# Patient Record
Sex: FEMALE | Race: BLACK OR AFRICAN AMERICAN | NOT HISPANIC OR LATINO | Employment: OTHER | URBAN - METROPOLITAN AREA
[De-identification: names, ages, dates, MRNs, and addresses within clinical notes are randomized per-mention and may not be internally consistent; named-entity substitution may affect disease eponyms.]

---

## 2021-11-03 ENCOUNTER — HISTORICAL (OUTPATIENT)
Dept: ADMINISTRATIVE | Facility: HOSPITAL | Age: 66
End: 2021-11-03

## 2021-11-05 LAB — FINAL CULTURE: NORMAL

## 2024-03-23 ENCOUNTER — HOSPITAL ENCOUNTER (INPATIENT)
Facility: HOSPITAL | Age: 69
LOS: 4 days | Discharge: HOME | DRG: 392 | End: 2024-03-27
Attending: INTERNAL MEDICINE | Admitting: INTERNAL MEDICINE
Payer: MEDICARE

## 2024-03-23 DIAGNOSIS — K56.699 STRICTURE OF SIGMOID COLON: ICD-10-CM

## 2024-03-23 DIAGNOSIS — R10.30 LOWER ABDOMINAL PAIN: Primary | ICD-10-CM

## 2024-03-23 DIAGNOSIS — I31.39 PERICARDIAL EFFUSION: ICD-10-CM

## 2024-03-23 DIAGNOSIS — Z01.818 PRE-OP EXAM: ICD-10-CM

## 2024-03-23 LAB
ALBUMIN SERPL-MCNC: 4 G/DL (ref 3.4–4.8)
ALBUMIN/GLOB SERPL: 1.3 RATIO (ref 1.1–2)
ALP SERPL-CCNC: 56 UNIT/L (ref 40–150)
ALT SERPL-CCNC: 21 UNIT/L (ref 0–55)
APPEARANCE UR: CLEAR
APTT PPP: 28.5 SECONDS (ref 23.2–33.7)
AST SERPL-CCNC: 20 UNIT/L (ref 5–34)
BACTERIA #/AREA URNS AUTO: ABNORMAL /HPF
BASOPHILS # BLD AUTO: 0.03 X10(3)/MCL
BASOPHILS NFR BLD AUTO: 0.4 %
BILIRUB SERPL-MCNC: 0.6 MG/DL
BILIRUB UR QL STRIP.AUTO: NEGATIVE
BUN SERPL-MCNC: 20.4 MG/DL (ref 9.8–20.1)
CALCIUM SERPL-MCNC: 9.2 MG/DL (ref 8.4–10.2)
CHLORIDE SERPL-SCNC: 109 MMOL/L (ref 98–107)
CO2 SERPL-SCNC: 24 MMOL/L (ref 23–31)
COLOR UR AUTO: ABNORMAL
CREAT SERPL-MCNC: 0.92 MG/DL (ref 0.55–1.02)
EOSINOPHIL # BLD AUTO: 0.02 X10(3)/MCL (ref 0–0.9)
EOSINOPHIL NFR BLD AUTO: 0.3 %
ERYTHROCYTE [DISTWIDTH] IN BLOOD BY AUTOMATED COUNT: 13.4 % (ref 11.5–17)
GFR SERPLBLD CREATININE-BSD FMLA CKD-EPI: >60 MLS/MIN/1.73/M2
GLOBULIN SER-MCNC: 3.1 GM/DL (ref 2.4–3.5)
GLUCOSE SERPL-MCNC: 79 MG/DL (ref 82–115)
GLUCOSE UR QL STRIP.AUTO: NORMAL
HCT VFR BLD AUTO: 36.7 % (ref 37–47)
HGB BLD-MCNC: 11.9 G/DL (ref 12–16)
IMM GRANULOCYTES # BLD AUTO: 0.03 X10(3)/MCL (ref 0–0.04)
IMM GRANULOCYTES NFR BLD AUTO: 0.4 %
INR PPP: 1.1
KETONES UR QL STRIP.AUTO: NEGATIVE
LEUKOCYTE ESTERASE UR QL STRIP.AUTO: NEGATIVE
LYMPHOCYTES # BLD AUTO: 1.51 X10(3)/MCL (ref 0.6–4.6)
LYMPHOCYTES NFR BLD AUTO: 19.1 %
MCH RBC QN AUTO: 29.5 PG (ref 27–31)
MCHC RBC AUTO-ENTMCNC: 32.4 G/DL (ref 33–36)
MCV RBC AUTO: 90.8 FL (ref 80–94)
MONOCYTES # BLD AUTO: 0.65 X10(3)/MCL (ref 0.1–1.3)
MONOCYTES NFR BLD AUTO: 8.2 %
MUCOUS THREADS URNS QL MICRO: ABNORMAL /LPF
NEUTROPHILS # BLD AUTO: 5.66 X10(3)/MCL (ref 2.1–9.2)
NEUTROPHILS NFR BLD AUTO: 71.6 %
NITRITE UR QL STRIP.AUTO: NEGATIVE
NRBC BLD AUTO-RTO: 0 %
PH UR STRIP.AUTO: 6.5 [PH]
PLATELET # BLD AUTO: 171 X10(3)/MCL (ref 130–400)
PMV BLD AUTO: 11.7 FL (ref 7.4–10.4)
POTASSIUM SERPL-SCNC: 3.9 MMOL/L (ref 3.5–5.1)
PROT SERPL-MCNC: 7.1 GM/DL (ref 5.8–7.6)
PROT UR QL STRIP.AUTO: ABNORMAL
PROTHROMBIN TIME: 14.2 SECONDS (ref 12.5–14.5)
RBC # BLD AUTO: 4.04 X10(6)/MCL (ref 4.2–5.4)
RBC #/AREA URNS AUTO: ABNORMAL /HPF
RBC UR QL AUTO: NEGATIVE
SODIUM SERPL-SCNC: 142 MMOL/L (ref 136–145)
SP GR UR STRIP.AUTO: 1.02 (ref 1–1.03)
SQUAMOUS #/AREA URNS LPF: ABNORMAL /HPF
UROBILINOGEN UR STRIP-ACNC: NORMAL
WBC # SPEC AUTO: 7.9 X10(3)/MCL (ref 4.5–11.5)
WBC #/AREA URNS AUTO: ABNORMAL /HPF

## 2024-03-23 PROCEDURE — 96374 THER/PROPH/DIAG INJ IV PUSH: CPT

## 2024-03-23 PROCEDURE — 80053 COMPREHEN METABOLIC PANEL: CPT | Performed by: NURSE PRACTITIONER

## 2024-03-23 PROCEDURE — 85025 COMPLETE CBC W/AUTO DIFF WBC: CPT | Performed by: NURSE PRACTITIONER

## 2024-03-23 PROCEDURE — 96375 TX/PRO/DX INJ NEW DRUG ADDON: CPT

## 2024-03-23 PROCEDURE — 63600175 PHARM REV CODE 636 W HCPCS: Performed by: NURSE PRACTITIONER

## 2024-03-23 PROCEDURE — 93010 ELECTROCARDIOGRAM REPORT: CPT | Mod: ,,, | Performed by: INTERNAL MEDICINE

## 2024-03-23 PROCEDURE — 11000001 HC ACUTE MED/SURG PRIVATE ROOM

## 2024-03-23 PROCEDURE — 85610 PROTHROMBIN TIME: CPT | Performed by: NURSE PRACTITIONER

## 2024-03-23 PROCEDURE — 81001 URINALYSIS AUTO W/SCOPE: CPT | Performed by: NURSE PRACTITIONER

## 2024-03-23 PROCEDURE — 93005 ELECTROCARDIOGRAM TRACING: CPT

## 2024-03-23 PROCEDURE — 99285 EMERGENCY DEPT VISIT HI MDM: CPT | Mod: 25

## 2024-03-23 PROCEDURE — 25500020 PHARM REV CODE 255: Performed by: NURSE PRACTITIONER

## 2024-03-23 PROCEDURE — 85730 THROMBOPLASTIN TIME PARTIAL: CPT | Performed by: NURSE PRACTITIONER

## 2024-03-23 RX ORDER — HYDRALAZINE HYDROCHLORIDE 20 MG/ML
10 INJECTION INTRAMUSCULAR; INTRAVENOUS
Status: COMPLETED | OUTPATIENT
Start: 2024-03-23 | End: 2024-03-23

## 2024-03-23 RX ORDER — ONDANSETRON HYDROCHLORIDE 2 MG/ML
4 INJECTION, SOLUTION INTRAVENOUS
Status: COMPLETED | OUTPATIENT
Start: 2024-03-23 | End: 2024-03-23

## 2024-03-23 RX ORDER — LOSARTAN POTASSIUM 100 MG/1
100 TABLET ORAL DAILY
COMMUNITY

## 2024-03-23 RX ORDER — MORPHINE SULFATE 4 MG/ML
4 INJECTION, SOLUTION INTRAMUSCULAR; INTRAVENOUS
Status: COMPLETED | OUTPATIENT
Start: 2024-03-23 | End: 2024-03-23

## 2024-03-23 RX ORDER — SIMVASTATIN 20 MG/1
20 TABLET, FILM COATED ORAL NIGHTLY
COMMUNITY

## 2024-03-23 RX ORDER — AMLODIPINE BESYLATE 5 MG/1
5 TABLET ORAL 2 TIMES DAILY
COMMUNITY

## 2024-03-23 RX ORDER — ESTRADIOL 2 MG/1
2 TABLET ORAL DAILY
COMMUNITY

## 2024-03-23 RX ORDER — ONDANSETRON HYDROCHLORIDE 2 MG/ML
4 INJECTION, SOLUTION INTRAVENOUS EVERY 6 HOURS PRN
Status: DISCONTINUED | OUTPATIENT
Start: 2024-03-23 | End: 2024-03-27 | Stop reason: HOSPADM

## 2024-03-23 RX ORDER — HYDRALAZINE HYDROCHLORIDE 20 MG/ML
10 INJECTION INTRAMUSCULAR; INTRAVENOUS EVERY 4 HOURS PRN
Status: DISCONTINUED | OUTPATIENT
Start: 2024-03-23 | End: 2024-03-27 | Stop reason: HOSPADM

## 2024-03-23 RX ORDER — DOXAZOSIN 4 MG/1
4 TABLET ORAL NIGHTLY
COMMUNITY

## 2024-03-23 RX ORDER — ATENOLOL 100 MG/1
100 TABLET ORAL 2 TIMES DAILY
COMMUNITY

## 2024-03-23 RX ORDER — SODIUM CHLORIDE, SODIUM LACTATE, POTASSIUM CHLORIDE, CALCIUM CHLORIDE 600; 310; 30; 20 MG/100ML; MG/100ML; MG/100ML; MG/100ML
INJECTION, SOLUTION INTRAVENOUS CONTINUOUS
Status: DISCONTINUED | OUTPATIENT
Start: 2024-03-23 | End: 2024-03-24

## 2024-03-23 RX ADMIN — MORPHINE SULFATE 4 MG: 4 INJECTION INTRAVENOUS at 06:03

## 2024-03-23 RX ADMIN — HYDRALAZINE HYDROCHLORIDE 10 MG: 20 INJECTION INTRAMUSCULAR; INTRAVENOUS at 10:03

## 2024-03-23 RX ADMIN — HYDRALAZINE HYDROCHLORIDE 10 MG: 20 INJECTION INTRAMUSCULAR; INTRAVENOUS at 07:03

## 2024-03-23 RX ADMIN — SODIUM CHLORIDE, POTASSIUM CHLORIDE, SODIUM LACTATE AND CALCIUM CHLORIDE: 600; 310; 30; 20 INJECTION, SOLUTION INTRAVENOUS at 10:03

## 2024-03-23 RX ADMIN — ONDANSETRON 4 MG: 2 INJECTION INTRAMUSCULAR; INTRAVENOUS at 06:03

## 2024-03-23 RX ADMIN — IOHEXOL 87 ML: 350 INJECTION, SOLUTION INTRAVENOUS at 06:03

## 2024-03-23 NOTE — ED PROVIDER NOTES
Encounter Date: 3/23/2024       History     Chief Complaint   Patient presents with    Abdominal Pain     Pt. C/O bilat lower abd/lateral side pain with urinary frequency started worsening last night with diarrhea yesterday.. denies fever, n/v.. reports sees kidney doctor     See MDM    The history is provided by the patient. No  was used.     Review of patient's allergies indicates:  No Known Allergies  Past Medical History:   Diagnosis Date    Hypertension      Past Surgical History:   Procedure Laterality Date    HYSTERECTOMY       History reviewed. No pertinent family history.  Social History     Tobacco Use    Smoking status: Never    Smokeless tobacco: Never   Substance Use Topics    Alcohol use: Not Currently    Drug use: Never     Review of Systems   Constitutional:  Negative for fever.   Respiratory:  Negative for cough and shortness of breath.    Cardiovascular:  Negative for chest pain.   Gastrointestinal:  Positive for abdominal pain and diarrhea.   Genitourinary:  Negative for difficulty urinating and dysuria.   Musculoskeletal:  Negative for gait problem.   Skin:  Negative for color change.   Neurological:  Negative for dizziness, speech difficulty and headaches.   Psychiatric/Behavioral:  Negative for hallucinations and suicidal ideas.    All other systems reviewed and are negative.      Physical Exam     Initial Vitals [03/23/24 1543]   BP Pulse Resp Temp SpO2   (!) 217/119 67 16 98.1 °F (36.7 °C) 98 %      MAP       --         Physical Exam    Nursing note and vitals reviewed.  Constitutional: She appears well-developed and well-nourished.   HENT:   Head: Normocephalic.   Eyes: EOM are normal.   Neck:   Normal range of motion.  Cardiovascular:  Normal rate, regular rhythm, normal heart sounds and intact distal pulses.           Pulmonary/Chest: Breath sounds normal. No respiratory distress.   Abdominal: Abdomen is soft. Bowel sounds are normal. There is no abdominal tenderness.    Musculoskeletal:         General: Normal range of motion.      Cervical back: Normal range of motion.     Neurological: She is alert and oriented to person, place, and time. She has normal strength.   Skin: Skin is warm and dry.   Psychiatric: She has a normal mood and affect. Her behavior is normal. Judgment and thought content normal.         ED Course   Procedures  Labs Reviewed   COMPREHENSIVE METABOLIC PANEL - Abnormal; Notable for the following components:       Result Value    Chloride 109 (*)     Glucose Level 79 (*)     Blood Urea Nitrogen 20.4 (*)     All other components within normal limits   URINALYSIS, REFLEX TO URINE CULTURE - Abnormal; Notable for the following components:    Protein, UA 1+ (*)     Mucous, UA Trace (*)     All other components within normal limits   CBC WITH DIFFERENTIAL - Abnormal; Notable for the following components:    RBC 4.04 (*)     Hgb 11.9 (*)     Hct 36.7 (*)     MCHC 32.4 (*)     MPV 11.7 (*)     All other components within normal limits   CBC W/ AUTO DIFFERENTIAL    Narrative:     The following orders were created for panel order CBC Auto Differential.  Procedure                               Abnormality         Status                     ---------                               -----------         ------                     CBC with Differential[[id number]]        Abnormal            Final result                 Please view results for these tests on the individual orders.   APTT   PROTIME-INR          Imaging Results              X-Ray Chest AP Portable (In process)                      CT Abdomen Pelvis With IV Contrast NO Oral Contrast (Final result)  Result time 03/23/24 19:16:38      Final result by Murali Ramirez MD (03/23/24 19:16:38)                   Impression:      1. Bilateral kidneys scarring and hypodense cystic structure discussed above.  Bilateral nonspecific perinephric strandings.  These may be chronic.  Please correlate clinically there is no acute  urinary tract infection.    2. Long segment of sigmoid colon narrowing probably is the result of chronic diverticulitis.  A pathologic stricture is difficult to exclude on this exam.  Please correlate, patient is up-to-date on colonoscopy.  No convincing findings of acute diverticulitis.    3.  Small pericardial effusion and trace of right pleural effusion.      Electronically signed by: Murali Ramirez  Date:    03/23/2024  Time:    19:16               Narrative:    EXAMINATION:  CT ABDOMEN PELVIS WITH IV CONTRAST    CLINICAL HISTORY:  lower abdominal pain;    TECHNIQUE:  Multidetector axial images were obtained of the abdomen and pelvis following the administration of IV contrast. Oral contrast was not administered.    Dose length product of 683 mGycm. Automated exposure control was utilized to minimize radiation dose.    COMPARISON:  None available.    FINDINGS:  Visualized portion of the heart is enlarged in size and there is partially visualized pericardial effusion with maximum thickness of 1.4 cm.  There is trace of fluid within the right dependent pleural space.  No acute infiltrates or congestive opacities of imaged portion of the lungs.    Hepatic volume and attenuation is unremarkable. Gallbladder wall is not thickened and there is no intra luminal calcified calculus. No biliary dilation identified. Pancreatic unremarkable attenuation without acute peripancreatic phlegmons. Main pancreatic duct is not dilated. Spleen is of normal size without focal lesion.    Right adrenal gland is unremarkable.  There is slight thickening of the left adrenal gland.  Bilateral kidneys lobular contour is consistent with scarring.  The left kidney up to 2.5 cm hypodensities which are favored to be cysts and for these no specific imaging is recommended.  There are additional very small kidneys hypodensities which may again represent cysts but are small to characterize and can be further assessed with ultrasound exam.  There are  perinephric strandings combined minimal amount of fluid.  No kidneys collecting system dilatation and there is no hydroureter.  There are no retroperitoneal periaortic enlarged lymph nodes.    Stomach is decompressed and difficult to assess.  Small hiatal hernia.  Distal esophageal mild mural thickening may be the result of reflux disease.  No abnormal dilatation of the loops of small bowel.  Appendix is nondilated.  There is diverticulosis coli with sigmoid colon predominance.  There is irregular luminal narrowing of the proximal portion of the sigmoid colon from image 91 to image 98 series 2 which may be the result of chronic diverticulitis.  Possibility of pathologic stricture is difficult to exclude on this exam.  Please correlate patient is up-to-date on colonoscopy.  There are no convincing findings of acute diverticulitis and there is no bowel obstruction, free air or free fluid.    Urinary bladder appears within normal limits. There is no pelvic free fluid.  There is right pelvic intramuscular lipoma    There is grade 1 anterolisthesis of L4 on L5.  Multilevel thoracolumbar degenerative changes.  Demineralization of the bones.  Chronic deformity of the left iliac bone.  No acute or otherwise osseous abnormality identified.                                       Medications   ondansetron injection 4 mg (has no administration in time range)   hydrALAZINE injection 10 mg (has no administration in time range)   lactated ringers infusion (has no administration in time range)   iohexoL (OMNIPAQUE 350) injection 87 mL (87 mLs Intravenous Given 3/23/24 1850)   morphine injection 4 mg (4 mg Intravenous Given 3/23/24 1854)   ondansetron injection 4 mg (4 mg Intravenous Given 3/23/24 1854)   hydrALAZINE injection 10 mg (10 mg Intravenous Given 3/23/24 1956)     Medical Decision Making  Historian:  Patient.  Patient is a 68 year female  that presents with lower abdominal pain that has been present yesterday. Associated  symptoms diarrhea. Surrounding information is nothing. Exacerbated by nothing. Relieved by nothing. Patient treatment prior to arrival none. Risk factors include none. Other history pertaining to this complaint nothing.   Assessment:  See physical exam.  DD:  Gastroenteritis, diverticulitis, appendicitis, colitis  ED Course: History was obtained.  Physical was performed.  Patient will be admitted to medicine with a Gastroenterology consult.  I discussed this with Dr. Tyler, emergency room physician, he agrees with this plan of care and will perform a face-to-face interaction with patient. Medical or surgical consults:  Surgery, Gastroenterology, Hospital Medicine. Social determinants that affect healthcare:  None.       Amount and/or Complexity of Data Reviewed  Independent Historian:      Details: Did get some extra history from son on phone  Labs:      Details: Labs unremarkable  Radiology: ordered.     Details: CT scan of abdomen and pelvis shows a sigmoid stricture  Discussion of management or test interpretation with external provider(s): Spoke to Dr. Rehman with surgery, he saw the patient and cleared them for obstruction.  I then spoke to Dr. Sanchez, Gastroenterology, he recommends patient admitted for a colonoscopy on Monday.  He would like the patient admitted to Hospital medicine.  I did speak to Angela, nurse practitioner with Hospital Medicine, they accept the admission    Risk  Decision regarding hospitalization.               ED Course as of 03/23/24 2123   Sat Mar 23, 2024   2025 Paged Surgical Hospitalist [CL]   2037 Spoke to Surgical Hospitalist Dr Garcia. Recommends GI for colonoscopy.  [CL]   2041 Paged GI [CL]      ED Course User Index  [CL] Fabian Ching, LIZAP                           Clinical Impression:  Final diagnoses:  [R10.30] Lower abdominal pain (Primary)  [Z01.818] Pre-op exam  [K56.699] Stricture of sigmoid colon          ED Disposition Condition    Admit Stable                 Fabian Ching, [facility]  03/23/24 212

## 2024-03-23 NOTE — FIRST PROVIDER EVALUATION
Medical screening examination initiated.  I have conducted a focused provider triage encounter, findings are as follows:    Brief history of present illness:  69y/o F presents to the ED with lower abdominal pain bilateral with urinary frequency. Denies any N/V     There were no vitals filed for this visit.    Pertinent physical exam:  AAA x 3    Brief workup plan:  Labs    Preliminary workup initiated; this workup will be continued and followed by the physician or advanced practice provider that is assigned to the patient when roomed.

## 2024-03-24 PROBLEM — K56.699 STRICTURE OF SIGMOID COLON: Status: ACTIVE | Noted: 2024-03-24

## 2024-03-24 LAB
AV INDEX (PROSTH): 0.77
AV MEAN GRADIENT: 5 MMHG
AV PEAK GRADIENT: 10 MMHG
AV VALVE AREA BY VELOCITY RATIO: 2.62 CM²
AV VALVE AREA: 2.67 CM²
AV VELOCITY RATIO: 0.76
BSA FOR ECHO PROCEDURE: 2.01 M2
DOP CALC AO PEAK VEL: 1.61 M/S
DOP CALC AO VTI: 37.9 CM
DOP CALC LVOT AREA: 3.5 CM2
DOP CALC LVOT DIAMETER: 2.1 CM
DOP CALC LVOT PEAK VEL: 1.22 M/S
DOP CALC LVOT STROKE VOLUME: 101.09 CM3
DOP CALC MV VTI: 30.7 CM
DOP CALCLVOT PEAK VEL VTI: 29.2 CM
E WAVE DECELERATION TIME: 264 MSEC
E/A RATIO: 0.98
E/E' RATIO: 9.05 M/S
LEFT ATRIUM SIZE: 3.9 CM
LEFT ATRIUM VOLUME INDEX MOD: 24.6 ML/M2
LEFT ATRIUM VOLUME MOD: 47.9 CM3
LV LATERAL E/E' RATIO: 7.17 M/S
LV SEPTAL E/E' RATIO: 12.29 M/S
LVOT MG: 3 MMHG
LVOT MV: 0.8 CM/S
MV MEAN GRADIENT: 2 MMHG
MV PEAK A VEL: 0.88 M/S
MV PEAK E VEL: 0.86 M/S
MV PEAK GRADIENT: 5 MMHG
MV STENOSIS PRESSURE HALF TIME: 47 MS
MV VALVE AREA BY CONTINUITY EQUATION: 3.29 CM2
MV VALVE AREA P 1/2 METHOD: 4.68 CM2
OHS QRS DURATION: 128 MS
OHS QTC CALCULATION: 470 MS
PISA TR MAX VEL: 3.3 M/S
RA PRESSURE ESTIMATED: 3 MMHG
RV TB RVSP: 6 MMHG
TDI LATERAL: 0.12 M/S
TDI SEPTAL: 0.07 M/S
TDI: 0.1 M/S
TR MAX PG: 44 MMHG
TRICUSPID ANNULAR PLANE SYSTOLIC EXCURSION: 2.72 CM
TV REST PULMONARY ARTERY PRESSURE: 47 MMHG

## 2024-03-24 PROCEDURE — 11000001 HC ACUTE MED/SURG PRIVATE ROOM

## 2024-03-24 PROCEDURE — 27000207 HC ISOLATION

## 2024-03-24 PROCEDURE — 25000003 PHARM REV CODE 250: Performed by: PHYSICIAN ASSISTANT

## 2024-03-24 PROCEDURE — 63600175 PHARM REV CODE 636 W HCPCS: Performed by: PHYSICIAN ASSISTANT

## 2024-03-24 PROCEDURE — 25000003 PHARM REV CODE 250: Performed by: STUDENT IN AN ORGANIZED HEALTH CARE EDUCATION/TRAINING PROGRAM

## 2024-03-24 PROCEDURE — 87040 BLOOD CULTURE FOR BACTERIA: CPT | Performed by: PHYSICIAN ASSISTANT

## 2024-03-24 PROCEDURE — 63600175 PHARM REV CODE 636 W HCPCS: Performed by: NURSE PRACTITIONER

## 2024-03-24 PROCEDURE — 63600175 PHARM REV CODE 636 W HCPCS: Mod: JZ,JG | Performed by: STUDENT IN AN ORGANIZED HEALTH CARE EDUCATION/TRAINING PROGRAM

## 2024-03-24 RX ORDER — ATENOLOL 50 MG/1
100 TABLET ORAL 2 TIMES DAILY
Status: DISCONTINUED | OUTPATIENT
Start: 2024-03-24 | End: 2024-03-27 | Stop reason: HOSPADM

## 2024-03-24 RX ORDER — DOXAZOSIN 4 MG/1
4 TABLET ORAL NIGHTLY
Status: DISCONTINUED | OUTPATIENT
Start: 2024-03-24 | End: 2024-03-27 | Stop reason: HOSPADM

## 2024-03-24 RX ORDER — GLUCAGON 1 MG
1 KIT INJECTION
Status: DISCONTINUED | OUTPATIENT
Start: 2024-03-24 | End: 2024-03-27 | Stop reason: HOSPADM

## 2024-03-24 RX ORDER — METRONIDAZOLE 500 MG/100ML
500 INJECTION, SOLUTION INTRAVENOUS
Status: DISCONTINUED | OUTPATIENT
Start: 2024-03-24 | End: 2024-03-24

## 2024-03-24 RX ORDER — HYDROCODONE BITARTRATE AND ACETAMINOPHEN 5; 325 MG/1; MG/1
1 TABLET ORAL EVERY 8 HOURS PRN
Status: DISCONTINUED | OUTPATIENT
Start: 2024-03-24 | End: 2024-03-27 | Stop reason: HOSPADM

## 2024-03-24 RX ORDER — ACETAMINOPHEN 325 MG/1
650 TABLET ORAL EVERY 8 HOURS PRN
Status: DISCONTINUED | OUTPATIENT
Start: 2024-03-24 | End: 2024-03-27 | Stop reason: HOSPADM

## 2024-03-24 RX ORDER — ACETAMINOPHEN 325 MG/1
650 TABLET ORAL EVERY 4 HOURS PRN
Status: DISCONTINUED | OUTPATIENT
Start: 2024-03-24 | End: 2024-03-27 | Stop reason: HOSPADM

## 2024-03-24 RX ORDER — AMLODIPINE BESYLATE 5 MG/1
5 TABLET ORAL 2 TIMES DAILY
Status: DISCONTINUED | OUTPATIENT
Start: 2024-03-24 | End: 2024-03-26

## 2024-03-24 RX ORDER — LOSARTAN POTASSIUM 50 MG/1
100 TABLET ORAL DAILY
Status: DISCONTINUED | OUTPATIENT
Start: 2024-03-24 | End: 2024-03-27 | Stop reason: HOSPADM

## 2024-03-24 RX ORDER — ATORVASTATIN CALCIUM 10 MG/1
10 TABLET, FILM COATED ORAL DAILY
Status: DISCONTINUED | OUTPATIENT
Start: 2024-03-24 | End: 2024-03-27 | Stop reason: HOSPADM

## 2024-03-24 RX ORDER — METRONIDAZOLE 500 MG/100ML
500 INJECTION, SOLUTION INTRAVENOUS
Status: DISCONTINUED | OUTPATIENT
Start: 2024-03-24 | End: 2024-03-26

## 2024-03-24 RX ORDER — CIPROFLOXACIN 2 MG/ML
400 INJECTION, SOLUTION INTRAVENOUS
Status: DISCONTINUED | OUTPATIENT
Start: 2024-03-24 | End: 2024-03-26

## 2024-03-24 RX ORDER — IBUPROFEN 200 MG
16 TABLET ORAL
Status: DISCONTINUED | OUTPATIENT
Start: 2024-03-24 | End: 2024-03-27 | Stop reason: HOSPADM

## 2024-03-24 RX ORDER — CIPROFLOXACIN 2 MG/ML
400 INJECTION, SOLUTION INTRAVENOUS
Status: DISCONTINUED | OUTPATIENT
Start: 2024-03-24 | End: 2024-03-24

## 2024-03-24 RX ORDER — IBUPROFEN 200 MG
24 TABLET ORAL
Status: DISCONTINUED | OUTPATIENT
Start: 2024-03-24 | End: 2024-03-27 | Stop reason: HOSPADM

## 2024-03-24 RX ORDER — PANTOPRAZOLE SODIUM 40 MG/1
40 TABLET, DELAYED RELEASE ORAL
Status: DISCONTINUED | OUTPATIENT
Start: 2024-03-24 | End: 2024-03-27 | Stop reason: HOSPADM

## 2024-03-24 RX ADMIN — ATENOLOL 100 MG: 50 TABLET ORAL at 11:03

## 2024-03-24 RX ADMIN — DOXAZOSIN 4 MG: 4 TABLET ORAL at 09:03

## 2024-03-24 RX ADMIN — METRONIDAZOLE 500 MG: 5 INJECTION, SOLUTION INTRAVENOUS at 06:03

## 2024-03-24 RX ADMIN — PANTOPRAZOLE SODIUM 40 MG: 40 TABLET, DELAYED RELEASE ORAL at 03:03

## 2024-03-24 RX ADMIN — ATORVASTATIN CALCIUM 10 MG: 10 TABLET, FILM COATED ORAL at 11:03

## 2024-03-24 RX ADMIN — HYDRALAZINE HYDROCHLORIDE 10 MG: 20 INJECTION INTRAMUSCULAR; INTRAVENOUS at 05:03

## 2024-03-24 RX ADMIN — LOSARTAN POTASSIUM 100 MG: 50 TABLET, FILM COATED ORAL at 11:03

## 2024-03-24 RX ADMIN — AMLODIPINE BESYLATE 5 MG: 5 TABLET ORAL at 11:03

## 2024-03-24 RX ADMIN — AMLODIPINE BESYLATE 5 MG: 5 TABLET ORAL at 09:03

## 2024-03-24 RX ADMIN — CIPROFLOXACIN 400 MG: 2 INJECTION, SOLUTION INTRAVENOUS at 10:03

## 2024-03-24 RX ADMIN — CIPROFLOXACIN 400 MG: 2 INJECTION, SOLUTION INTRAVENOUS at 12:03

## 2024-03-24 RX ADMIN — ATENOLOL 100 MG: 50 TABLET ORAL at 09:03

## 2024-03-24 NOTE — H&P
[facility] Hospital Medicine History & Physical Examination       Patient Name: Kera Hoffman  MRN: [id number]  Patient Class: IP- Inpatient   Admission Date: 03/24/2024   Admitting Service: Hospital Medicine   Length of Stay: 1  Attending Physician: Thairy Reyes, DO  Primary Care Provider: No primary care provider on file.  Face-to-Face encounter date: 03/24/2024  Code Status: Full code   Chief Complaint: Abdominal Pain (Pt. C/O bilat lower abd/lateral side pain with urinary frequency started worsening last night with diarrhea yesterday.. denies fever, n/v.. reports sees kidney doctor)      Present at Bedside:  Patient information was obtained from patient, patient's family, past medical records and ER records.      HISTORY OF PRESENT ILLNESS:   Kera Hoffman is a 68 y.o. female with a past medical history of essential hypertension, hyperlipidemia, and diverticulitis who presented to [facility] on 3/23/2024 with c/o abdominal pain.  Patient reported lower abdominal pain with diarrhea.  Patient reported 2 episodes of non-bloody diarrhea.  Patient denied nausea, vomiting, fever, chills, dysuria, hematuria, rectal bleeding, dark stools, chest pain, and shortness of breath.  Initial vital signs in ED were /119, pulse 67, respirations 16, temperature 36.7° C, and SpO2 98% on room air.  Labs revealed WBC 7.90, RBC 4.04, hemoglobin 11.9, hematocrit 36.7, chloride 109,  BUN 20.4, and creatinine 0.92.  UA revealed 1+ protein.  CT abdomen and pelvis with IV contrast revealed bilateral nonspecific perinephric stranding which may be chronic with recommendations of correlation for UTI, long segment of sigmoid colon narrowing probably the result of chronic diverticulitis with pathological stricture difficult to exclude, and small pericardial effusion and trace right pleural effusion.  Chest x-ray revealed no acute cardiopulmonary process identified.  GI was consulted with plans for  colonoscopy.  Patient was given IV Zofran 4 mg, IV hydralazine 10 mg, and IV morphine 4 mg in ED. Patient was admitted to hospital medicine service for further medical management.     PAST MEDICAL HISTORY:   Essential hypertension  Hyperlipidemia   Diverticulitis    PAST SURGICAL HISTORY:     Past Surgical History:   Procedure Laterality Date    HYSTERECTOMY         FAMILY HISTORY:   Diabetes: Sister and maternal uncle    SOCIAL HISTORY:   Denied alcohol, tobacco or illicit drug use.     ALLERGIES:   Patient has no known allergies.    HOME MEDICATIONS:     Prior to Admission medications    Medication Sig Start Date End Date Taking? Authorizing Provider   amLODIPine (NORVASC) 5 MG tablet Take 5 mg by mouth 2 (two) times daily.   Yes Provider, Historical   atenoloL (TENORMIN) 100 MG tablet Take 100 mg by mouth 2 (two) times a day.   Yes Provider, Historical   doxazosin (CARDURA) 4 MG tablet Take 4 mg by mouth every evening.   Yes Provider, Historical   estradioL (ESTRACE) 2 MG tablet Take 2 mg by mouth once daily.   Yes Provider, Historical   losartan (COZAAR) 100 MG tablet Take 100 mg by mouth once daily.   Yes Provider, Historical   simvastatin (ZOCOR) 20 MG tablet Take 20 mg by mouth every evening.   Yes Provider, Historical   doxylamine-phenylephrine 10.5-10 mg Tab Take by mouth every 6 (six) hours as needed.    Provider, Historical     ________________________________________________________________________  INPATIENT LIST OF MEDICATIONS     Current Facility-Administered Medications:     acetaminophen tablet 650 mg, 650 mg, Oral, Q8H PRN, Darrell Zuleta PA-C    acetaminophen tablet 650 mg, 650 mg, Oral, Q4H PRN, Darrell Zuleta PA-C    dextrose 10% bolus 125 mL 125 mL, 12.5 g, Intravenous, PRN, Darrell Zuleta PA-C    dextrose 10% bolus 250 mL 250 mL, 25 g, Intravenous, PRN, Darrell Zuleta PA-C    glucagon (human recombinant) injection 1 mg, 1 mg, Intramuscular, PRN, Darrell Zuleta PA-C    glucose  chewable tablet 16 g, 16 g, Oral, PRN, Darrell Zuleta PA-C    glucose chewable tablet 24 g, 24 g, Oral, PRN, Darrell Zuleta PA-C    hydrALAZINE injection 10 mg, 10 mg, Intravenous, Q4H PRN, MolRaul valentey L, FNP, 10 mg at 03/24/24 0505    lactated ringers infusion, , Intravenous, Continuous, MolbertRauly L, FNP, Last Rate: 75 mL/hr at 03/23/24 2219, New Bag at 03/23/24 2219    ondansetron injection 4 mg, 4 mg, Intravenous, Q6H PRN, Molbert Angela L, FNP    Scheduled Meds:  Continuous Infusions:   lactated ringers 75 mL/hr at 03/23/24 2219     PRN Meds:.acetaminophen, acetaminophen, dextrose 10%, dextrose 10%, glucagon (human recombinant), glucose, glucose, hydrALAZINE, ondansetron      REVIEW OF SYSTEMS:   Except as documented, all other systems reviewed and negative.    PHYSICAL EXAM:     VITAL SIGNS: 24 HRS MIN & MAX LAST   Temp  Min: 98.1 °F (36.7 °C)  Max: 100.1 °F (37.8 °C) 100.1 °F (37.8 °C)   BP  Min: 135/68  Max: 217/119 (!) 143/77   Pulse  Min: 60  Max: 79  79   Resp  Min: 16  Max: 25 20   SpO2  Min: 95 %  Max: 98 % 98 %       General appearance: Female in no apparent distress.  HENT: Atraumatic head.   Eyes: Normal extraocular movements.   Neck: Supple.   Lungs: Clear to auscultation bilaterally.   Heart: Regular rate and rhythm.  Abdomen: Soft, non-distended, non-tender.   Extremities: No cyanosis, clubbing, or deformities.   Skin: No Rash.   Neuro: Awake, alert, and oriented. Motor and sensory exams grossly intact.   Psych/mental status: Appropriate mood and affect. Responds appropriately to questions.     LABS AND IMAGING:     Recent Labs   Lab 03/23/24  1612   WBC 7.90   RBC 4.04*   HGB 11.9*   HCT 36.7*   MCV 90.8   MCH 29.5   MCHC 32.4*   RDW 13.4      MPV 11.7*       Recent Labs   Lab 03/23/24  1611      K 3.9   CO2 24   BUN 20.4*   CREATININE 0.92   CALCIUM 9.2   ALBUMIN 4.0   ALKPHOS 56   ALT 21   AST 20   BILITOT 0.6       Microbiology Results (last 7 days)       ** No  results found for the last 168 hours. **             X-Ray Chest AP Portable  Narrative: EXAMINATION:  XR CHEST AP PORTABLE    CLINICAL HISTORY:  preop exam;    TECHNIQUE:  One view    COMPARISON:  None available.    FINDINGS:  Cardiopericardial silhouette enlarged appearance is similar.  Lungs are without dense focal or segmental consolidation, congestive process, pleural effusions or pneumothorax.  Impression: NO ACUTE CARDIOPULMONARY PROCESS IDENTIFIED.    Electronically signed by: Murali Ramirez  Date:    03/23/2024  Time:    21:59  CT Abdomen Pelvis With IV Contrast NO Oral Contrast  Narrative: EXAMINATION:  CT ABDOMEN PELVIS WITH IV CONTRAST    CLINICAL HISTORY:  lower abdominal pain;    TECHNIQUE:  Multidetector axial images were obtained of the abdomen and pelvis following the administration of IV contrast. Oral contrast was not administered.    Dose length product of 683 mGycm. Automated exposure control was utilized to minimize radiation dose.    COMPARISON:  None available.    FINDINGS:  Visualized portion of the heart is enlarged in size and there is partially visualized pericardial effusion with maximum thickness of 1.4 cm.  There is trace of fluid within the right dependent pleural space.  No acute infiltrates or congestive opacities of imaged portion of the lungs.    Hepatic volume and attenuation is unremarkable. Gallbladder wall is not thickened and there is no intra luminal calcified calculus. No biliary dilation identified. Pancreatic unremarkable attenuation without acute peripancreatic phlegmons. Main pancreatic duct is not dilated. Spleen is of normal size without focal lesion.    Right adrenal gland is unremarkable.  There is slight thickening of the left adrenal gland.  Bilateral kidneys lobular contour is consistent with scarring.  The left kidney up to 2.5 cm hypodensities which are favored to be cysts and for these no specific imaging is recommended.  There are additional very small kidneys  hypodensities which may again represent cysts but are small to characterize and can be further assessed with ultrasound exam.  There are perinephric strandings combined minimal amount of fluid.  No kidneys collecting system dilatation and there is no hydroureter.  There are no retroperitoneal periaortic enlarged lymph nodes.    Stomach is decompressed and difficult to assess.  Small hiatal hernia.  Distal esophageal mild mural thickening may be the result of reflux disease.  No abnormal dilatation of the loops of small bowel.  Appendix is nondilated.  There is diverticulosis coli with sigmoid colon predominance.  There is irregular luminal narrowing of the proximal portion of the sigmoid colon from image 91 to image 98 series 2 which may be the result of chronic diverticulitis.  Possibility of pathologic stricture is difficult to exclude on this exam.  Please correlate patient is up-to-date on colonoscopy.  There are no convincing findings of acute diverticulitis and there is no bowel obstruction, free air or free fluid.    Urinary bladder appears within normal limits. There is no pelvic free fluid.  There is right pelvic intramuscular lipoma    There is grade 1 anterolisthesis of L4 on L5.  Multilevel thoracolumbar degenerative changes.  Demineralization of the bones.  Chronic deformity of the left iliac bone.  No acute or otherwise osseous abnormality identified.  Impression: 1. Bilateral kidneys scarring and hypodense cystic structure discussed above.  Bilateral nonspecific perinephric strandings.  These may be chronic.  Please correlate clinically there is no acute urinary tract infection.    2. Long segment of sigmoid colon narrowing probably is the result of chronic diverticulitis.  A pathologic stricture is difficult to exclude on this exam.  Please correlate, patient is up-to-date on colonoscopy.  No convincing findings of acute diverticulitis.    3.  Small pericardial effusion and trace of right pleural  effusion.    Electronically signed by: Murali Ramirez  Date:    03/23/2024  Time:    19:16        ASSESSMENT & PLAN:   Assessment:   Acute on chronic diverticulitis   Sigmoid stricture  Bilateral nonspecific perinephric stranding  Small pericardial effusion and trace right pleural effusion  Normocytic anemia  History of diverticulitis, hypertension and hyperlipidemia      Plan:  GI consulted with plans for colonoscopy tomorrow 03/24/2025  Clear liquid diet  NPO after midnight   Low-grade temperature this morning of 100.1   IV Cipro and Flagyl started   Blood cultures pending, follow-up results  Stool studies, occult blood, fecal leukocytes, and C diff ordered  GI panel   Echo  UA:  1+ protein  Resume home medications as deemed appropriate once medication reconciliation is updated  Labs in AM    VTE Prophylaxis: SCDs    Discharge Planning and Disposition: TBD    JORDAN, Darrell Zuleta PA-C have reviewed and discussed the case with Thairy Reyes, DO  Please see the attending MD's addendum for further assessment and plan.    Darrell Zuleta PA-C  Department of Hospital Medicine   [facility]   03/24/2024    This note was created with the assistance of [employer] voice recognition software. There may be transcription errors as a result of using this technology, however minimal. Effort has been made to assure accuracy of transcription, but any obvious errors or omissions should be clarified with the author of the document.    _______________________________________________________________________________  MD Addendum:  Dr. JORDAN , assumed care of this patient today at --am/pm  For the patient encounter, I performed the substantive portion of the visit, I reviewed the NP/PA documentation, treatment plan, and medical decision making.  I had face to face time with this patient     A. History: 68-year-old female with a past medical history of essential hypertension, hyperlipidemia who presents complaint of  abdominal pain. Last colonoscopy was more than 20 years ago, she reports polyp removals at that time. Lost to follow up in the interim. Affirms to prior hysterectomy, no concern as cancer and reports was done for fibroids. CT abdomen and pelvis in the emergency room revealed diverticulitis, renal cyst, mural thickening of the distal esophagus and a potential pathologic stricture us the sigmoid colon. Incidental finding of small pericardial effusion, echocardiogram pending. At the time of my exam patient is asymptomatic, denies abdominal pain. Pending evaluation with GI.     B. Physical exam:  General appearance: Female in no apparent distress. Obese  HENT: Atraumatic head.   Eyes: Normal extraocular movements.   Neck: Supple.   Lungs: Clear to auscultation bilaterally.   Heart: Regular rate and rhythm.  Abdomen: Soft, non-distended, non-tender.   Extremities: No cyanosis, clubbing, or deformities.   Skin: No Rash.   Neuro: Awake, alert, and oriented. Motor and sensory exams grossly intact.   Psych/mental status: Appropriate mood and affect. Responds appropriately to questions.     C. Medical decision making:  Diverticulosis/diverticulitis   Esophagitis  Questionable pathologic sigmoid stricture   Pericardial effusion  Renal cysts  L4 on L5 grade 1 anterolisthesis    History of: essential hypertension, hyperlipidemia, fibroadenoma    Cipro/Flagyl started on 03/24 for 10 day course (end date 4/3)  Clear liquid diet, NPO after midnight for colonoscopy planned for 3/25  Patient with incidental finding of mural thickening of the distal esophagus, reflux likely in the setting of small hiatal hernia  -start b.i.d. PPI, would benefit from EGD as well  Echocardiogram to further evaluate pericardial effusion  Pending retroperitoneal ultrasound further evaluate renal hypodensities    All diagnosis and differential diagnosis have been reviewed; assessment and plan has been documented; I have personally reviewed the labs and  test results that are presently available; I have reviewed the patients medication list; I have reviewed the consulting providers response and recommendations. I have reviewed or attempted to review medical records based upon their availability.    All of the patient and family questions have been addressed and answered. Patient's is agreeable to the above stated plan. I will continue to monitor closely and make adjustments to medical management as needed.      03/24/2024

## 2024-03-24 NOTE — NURSING
Nurses Note -- 4 Eyes      3/23/2024   10:42 PM      Skin assessed during: Admit      [x] No Altered Skin Integrity Present    []Prevention Measures Documented      [] Yes- Altered Skin Integrity Present or Discovered   [] LDA Added if Not in Epic (Describe Wound)   [] New Altered Skin Integrity was Present on Admit and Documented in LDA   [] Wound Image Taken    Wound Care Consulted? No    Attending Nurse:  Kinza Turner RN/Staff Member:  Kiki KAUR RN

## 2024-03-25 LAB
ALBUMIN SERPL-MCNC: 3.1 G/DL (ref 3.4–4.8)
ALBUMIN/GLOB SERPL: 1.2 RATIO (ref 1.1–2)
ALP SERPL-CCNC: 44 UNIT/L (ref 40–150)
ALT SERPL-CCNC: 16 UNIT/L (ref 0–55)
AST SERPL-CCNC: 18 UNIT/L (ref 5–34)
BASOPHILS # BLD AUTO: 0.01 X10(3)/MCL
BASOPHILS NFR BLD AUTO: 0.2 %
BILIRUB SERPL-MCNC: 0.4 MG/DL
BUN SERPL-MCNC: 13.5 MG/DL (ref 9.8–20.1)
CALCIUM SERPL-MCNC: 8.6 MG/DL (ref 8.4–10.2)
CHLORIDE SERPL-SCNC: 111 MMOL/L (ref 98–107)
CO2 SERPL-SCNC: 22 MMOL/L (ref 23–31)
CREAT SERPL-MCNC: 1.02 MG/DL (ref 0.55–1.02)
EOSINOPHIL # BLD AUTO: 0.03 X10(3)/MCL (ref 0–0.9)
EOSINOPHIL NFR BLD AUTO: 0.6 %
ERYTHROCYTE [DISTWIDTH] IN BLOOD BY AUTOMATED COUNT: 13.6 % (ref 11.5–17)
GFR SERPLBLD CREATININE-BSD FMLA CKD-EPI: 59 MLS/MIN/1.73/M2
GLOBULIN SER-MCNC: 2.5 GM/DL (ref 2.4–3.5)
GLUCOSE SERPL-MCNC: 80 MG/DL (ref 82–115)
HCT VFR BLD AUTO: 30.5 % (ref 37–47)
HGB BLD-MCNC: 9.9 G/DL (ref 12–16)
IMM GRANULOCYTES # BLD AUTO: 0.01 X10(3)/MCL (ref 0–0.04)
IMM GRANULOCYTES NFR BLD AUTO: 0.2 %
LYMPHOCYTES # BLD AUTO: 1.66 X10(3)/MCL (ref 0.6–4.6)
LYMPHOCYTES NFR BLD AUTO: 31.1 %
MCH RBC QN AUTO: 29.2 PG (ref 27–31)
MCHC RBC AUTO-ENTMCNC: 32.5 G/DL (ref 33–36)
MCV RBC AUTO: 90 FL (ref 80–94)
MONOCYTES # BLD AUTO: 0.7 X10(3)/MCL (ref 0.1–1.3)
MONOCYTES NFR BLD AUTO: 13.1 %
NEUTROPHILS # BLD AUTO: 2.92 X10(3)/MCL (ref 2.1–9.2)
NEUTROPHILS NFR BLD AUTO: 54.8 %
NRBC BLD AUTO-RTO: 0 %
PLATELET # BLD AUTO: 134 X10(3)/MCL (ref 130–400)
PLATELETS.RETICULATED NFR BLD AUTO: 3.7 % (ref 0.9–11.2)
PMV BLD AUTO: 11.4 FL (ref 7.4–10.4)
POTASSIUM SERPL-SCNC: 3.4 MMOL/L (ref 3.5–5.1)
PROT SERPL-MCNC: 5.6 GM/DL (ref 5.8–7.6)
RBC # BLD AUTO: 3.39 X10(6)/MCL (ref 4.2–5.4)
SODIUM SERPL-SCNC: 139 MMOL/L (ref 136–145)
WBC # SPEC AUTO: 5.33 X10(3)/MCL (ref 4.5–11.5)

## 2024-03-25 PROCEDURE — 63600175 PHARM REV CODE 636 W HCPCS: Performed by: STUDENT IN AN ORGANIZED HEALTH CARE EDUCATION/TRAINING PROGRAM

## 2024-03-25 PROCEDURE — 11000001 HC ACUTE MED/SURG PRIVATE ROOM

## 2024-03-25 PROCEDURE — 25000003 PHARM REV CODE 250: Performed by: INTERNAL MEDICINE

## 2024-03-25 PROCEDURE — 63600175 PHARM REV CODE 636 W HCPCS: Performed by: NURSE PRACTITIONER

## 2024-03-25 PROCEDURE — 85025 COMPLETE CBC W/AUTO DIFF WBC: CPT | Performed by: PHYSICIAN ASSISTANT

## 2024-03-25 PROCEDURE — 25000003 PHARM REV CODE 250: Performed by: PHYSICIAN ASSISTANT

## 2024-03-25 PROCEDURE — 25000003 PHARM REV CODE 250: Performed by: STUDENT IN AN ORGANIZED HEALTH CARE EDUCATION/TRAINING PROGRAM

## 2024-03-25 PROCEDURE — 80053 COMPREHEN METABOLIC PANEL: CPT | Performed by: PHYSICIAN ASSISTANT

## 2024-03-25 RX ORDER — SODIUM, POTASSIUM,MAG SULFATES 17.5-3.13G
2 SOLUTION, RECONSTITUTED, ORAL ORAL ONCE
Status: COMPLETED | OUTPATIENT
Start: 2024-03-25 | End: 2024-03-25

## 2024-03-25 RX ORDER — POTASSIUM CHLORIDE 20 MEQ/1
40 TABLET, EXTENDED RELEASE ORAL ONCE
Status: COMPLETED | OUTPATIENT
Start: 2024-03-25 | End: 2024-03-25

## 2024-03-25 RX ADMIN — DOXAZOSIN 4 MG: 4 TABLET ORAL at 08:03

## 2024-03-25 RX ADMIN — ATORVASTATIN CALCIUM 10 MG: 10 TABLET, FILM COATED ORAL at 09:03

## 2024-03-25 RX ADMIN — CIPROFLOXACIN 400 MG: 2 INJECTION, SOLUTION INTRAVENOUS at 11:03

## 2024-03-25 RX ADMIN — HYDRALAZINE HYDROCHLORIDE 10 MG: 20 INJECTION INTRAMUSCULAR; INTRAVENOUS at 11:03

## 2024-03-25 RX ADMIN — SODIUM SULFATE, POTASSIUM SULFATE, MAGNESIUM SULFATE 354 ML: 17.5; 3.13; 1.6 SOLUTION, CONCENTRATE ORAL at 08:03

## 2024-03-25 RX ADMIN — AMLODIPINE BESYLATE 5 MG: 5 TABLET ORAL at 09:03

## 2024-03-25 RX ADMIN — POTASSIUM CHLORIDE 40 MEQ: 1500 TABLET, EXTENDED RELEASE ORAL at 05:03

## 2024-03-25 RX ADMIN — METRONIDAZOLE 500 MG: 5 INJECTION, SOLUTION INTRAVENOUS at 02:03

## 2024-03-25 RX ADMIN — ATENOLOL 100 MG: 50 TABLET ORAL at 09:03

## 2024-03-25 RX ADMIN — AMLODIPINE BESYLATE 5 MG: 5 TABLET ORAL at 08:03

## 2024-03-25 RX ADMIN — METRONIDAZOLE 500 MG: 5 INJECTION, SOLUTION INTRAVENOUS at 11:03

## 2024-03-25 RX ADMIN — PANTOPRAZOLE SODIUM 40 MG: 40 TABLET, DELAYED RELEASE ORAL at 05:03

## 2024-03-25 RX ADMIN — LOSARTAN POTASSIUM 100 MG: 50 TABLET, FILM COATED ORAL at 09:03

## 2024-03-25 RX ADMIN — METRONIDAZOLE 500 MG: 5 INJECTION, SOLUTION INTRAVENOUS at 06:03

## 2024-03-25 NOTE — PLAN OF CARE
03/25/24 1552   Discharge Assessment   Assessment Type Discharge Planning Assessment   Confirmed/corrected address, phone number and insurance Yes   Confirmed Demographics Correct on Facesheet   Source of Information patient   When was your last doctors appointment?   (Patient reports November of last year.)   Communicated PAVEL with patient/caregiver Yes   Reason For Admission Lower Abd Pain   People in Home child(anca), adult;other relative(s);grandchild(anca)   Do you expect to return to your current living situation? Yes   Do you have help at home or someone to help you manage your care at home? Yes   Who are your caregiver(s) and their phone number(s)? Daughter: Flor Hoffman: [phone] and Son: Lazaro Becerra: [phone]   Prior to hospitilization cognitive status: Unable to Assess   Current cognitive status: Alert/Oriented   Walking or Climbing Stairs Difficulty no   Dressing/Bathing Difficulty no   Home Accessibility wheelchair accessible   Home Layout Able to live on 1st floor   Equipment Currently Used at Home none   Readmission within 30 days? No   Patient currently being followed by outpatient case management? No   Do you currently have service(s) that help you manage your care at home? No   Do you take prescription medications? Yes   Do you have prescription coverage? Yes   Coverage Medicare Part A & B along with [employer] of [place]   Do you have any problems affording any of your prescribed medications? No   Is the patient taking medications as prescribed? yes   Who is going to help you get home at discharge? Patient reports her daughterFlor.   How do you get to doctors appointments? car, drives self   Are you on dialysis? No   Do you take coumadin? No   Discharge Plan A Home with family   Discharge Plan B Home   DME Needed Upon Discharge  none   Discharge Plan discussed with: Patient   Transition of Care Barriers None   OTHER   Name(s) of People in Home Patient resides with her daughter, son-in-law and  grandchildren (ages 10 and 14).       CM completed Discharge Assessment with patient at bedside.  Patient's physical address is [address] [place] [id number].  Patient's PCP is Dr. Roge Mcintyre in [place].  Patient resides with her daughter, son-in-law and two grandchildren (ages 10 and 14).  No barriers to discharge at this time.

## 2024-03-25 NOTE — PROGRESS NOTES
Inpatient Nutrition Evaluation    Admit Date: 3/23/2024   Total duration of encounter: 2 days   Patient Age: 68 y.o.    Nutrition Recommendation/Prescription     -Advance diet as tolerated per MD. Goal Diet: Low Residue Diet.   -Monitor wt, labs, and intake.     Nutrition Assessment     Chart Review    Reason Seen: continuous nutrition monitoring    Malnutrition Screening Tool Results   Have you recently lost weight without trying?: No  Have you been eating poorly because of a decreased appetite?: No   MST Score: 0   Diagnosis:  Acute on chronic diverticulitis   Sigmoid stricture  Bilateral nonspecific perinephric stranding  Small pericardial effusion and trace right pleural effusion  Normocytic anemia    Relevant Medical History:   Essential hypertension  Hyperlipidemia   Diverticulitis    Scheduled Medications:  amLODIPine, 5 mg, BID  atenoloL, 100 mg, BID  atorvastatin, 10 mg, Daily  ciprofloxacin, 400 mg, Q12H  doxazosin, 4 mg, QHS  losartan, 100 mg, Daily  metronidazole, 500 mg, Q8H  pantoprazole, 40 mg, BID AC    Continuous Infusions:   PRN Medications: acetaminophen, acetaminophen, dextrose 10%, dextrose 10%, glucagon (human recombinant), glucose, glucose, hydrALAZINE, HYDROcodone-acetaminophen, ondansetron    Recent Labs   Lab 03/23/24  1611 03/23/24  1612 03/25/24  0416     --  139   K 3.9  --  3.4*   CALCIUM 9.2  --  8.6   CHLORIDE 109*  --  111*   CO2 24  --  22*   BUN 20.4*  --  13.5   CREATININE 0.92  --  1.02   EGFRNORACEVR >60  --  59   GLUCOSE 79*  --  80*   BILITOT 0.6  --  0.4   ALKPHOS 56  --  44   ALT 21  --  16   AST 20  --  18   ALBUMIN 4.0  --  3.1*   WBC  --  7.90 5.33   HGB  --  11.9* 9.9*   HCT  --  36.7* 30.5*     Nutrition Orders:  Diet Clear Liquid      Appetite/Oral Intake: not applicable/not applicable  Factors Affecting Nutritional Intake: clear liquid diet  Food/[employer]/Cultural Preferences: unable to obtain  Food Allergies: no known food allergies  Last Bowel Movement:  "03/23/24  Wound(s):  intact per EMR    Comments    3/25: Diet just advanced to clears- tolerance pending; will f/u early with pt.     Anthropometrics    Height: 5' 5" (165.1 cm),    Last Weight: 88 kg (194 lb) (03/23/24 2201), Weight Method: Bed Scale  BMI (Calculated): 32.3  BMI Classification: obese grade I (BMI 30-34.9)     Ideal Body Weight (IBW), Female: 125 lb     % Ideal Body Weight, Female (lb): 155.2 %                             Usual Weight Provided By: EMR weight history    Wt Readings from Last 5 Encounters:   03/23/24 88 kg (194 lb)     Weight Change(s) Since Admission:   Wt Readings from Last 1 Encounters:   03/23/24 2201 88 kg (194 lb)   Admit Weight: 88 kg (194 lb) (03/23/24 2201), Weight Method: Bed Scale    Patient Education     Not applicable.    Nutrition Goals & Monitoring     Dietitian will monitor: energy intake and weight change    Nutrition Risk/Follow-Up: low (follow-up in 5-7 days)  Patients assigned 'low nutrition risk' status do not qualify for a full nutritional assessment but will be monitored and re-evaluated in a 5-7 day time period. Please consult if re-evaluation needed sooner.   "

## 2024-03-25 NOTE — PROGRESS NOTES
[facility] Hospital Medicine Progress Note        Chief Complaint: Inpatient Follow-up for diverticulitis     HPI:   Kera Hoffman is a 68 y.o. female with a past medical history of essential hypertension, hyperlipidemia, and diverticulitis who presented to [facility] on 3/23/2024 with c/o abdominal pain.  Patient reported lower abdominal pain with diarrhea.  Patient reported 2 episodes of non-bloody diarrhea.  Patient denied nausea, vomiting, fever, chills, dysuria, hematuria, rectal bleeding, dark stools, chest pain, and shortness of breath.  Initial vital signs in ED were /119, pulse 67, respirations 16, temperature 36.7° C, and SpO2 98% on room air.  Labs revealed WBC 7.90, RBC 4.04, hemoglobin 11.9, hematocrit 36.7, chloride 109,  BUN 20.4, and creatinine 0.92.  UA revealed 1+ protein.  CT abdomen and pelvis with IV contrast revealed bilateral nonspecific perinephric stranding which may be chronic with recommendations of correlation for UTI, long segment of sigmoid colon narrowing probably the result of chronic diverticulitis with pathological stricture difficult to exclude, and small pericardial effusion and trace right pleural effusion.  Chest x-ray revealed no acute cardiopulmonary process identified.  GI was consulted with plans for colonoscopy.  Patient was given IV Zofran 4 mg, IV hydralazine 10 mg, and IV morphine 4 mg in ED. Patient was admitted to hospital medicine service for further medical management.     Seen by GI team and recommended IV abx and C-scope.   Interval Hx:   Patient today awake and comfortable, Has very minimal abdominal pain. No fever, chills. Passing flatus. No BM today.       Case was discussed with patient's nurse and  on the floor.    Objective/physical exam:  General: In no acute distress  Chest: Clear to auscultation bilaterally  Heart: RRR, +S1, S2, no appreciable murmur  Abdomen: Soft, nontender, BS +  MSK: Warm, no lower  extremity edema, no clubbing or cyanosis  Neurologic: Alert and oriented x4, Cranial nerve II-XII intact, Strength 5/5 in all 4 extremities    VITAL SIGNS: 24 HRS MIN & MAX LAST   Temp  Min: 98 °F (36.7 °C)  Max: 98.9 °F (37.2 °C) 98.8 °F (37.1 °C)   BP  Min: 150/77  Max: 169/82 (!) 169/82   Pulse  Min: 55  Max: 69  (!) 55   Resp  Min: 18  Max: 18 18   SpO2  Min: 89 %  Max: 98 % 95 %     I have reviewed the following labs:  Recent Labs   Lab 03/23/24  1612 03/25/24  0416   WBC 7.90 5.33   RBC 4.04* 3.39*   HGB 11.9* 9.9*   HCT 36.7* 30.5*   MCV 90.8 90.0   MCH 29.5 29.2   MCHC 32.4* 32.5*   RDW 13.4 13.6    134   MPV 11.7* 11.4*     Recent Labs   Lab 03/23/24  1611 03/25/24  0416    139   K 3.9 3.4*   CO2 24 22*   BUN 20.4* 13.5   CREATININE 0.92 1.02   CALCIUM 9.2 8.6   ALBUMIN 4.0 3.1*   ALKPHOS 56 44   ALT 21 16   AST 20 18   BILITOT 0.6 0.4     Microbiology Results (last 7 days)       Procedure Component Value Units Date/Time    Blood Culture [[id number]]  (Normal) Collected: 03/24/24 1135    Order Status: Completed Specimen: Blood Updated: 03/25/24 1301     CULTURE, BLOOD ([facility]) No Growth At 24 Hours    Blood Culture [[id number]]  (Normal) Collected: 03/24/24 1135    Order Status: Completed Specimen: Blood Updated: 03/25/24 1301     CULTURE, BLOOD ([facility]) No Growth At 24 Hours    Clostridium Diff Toxin, A & B, EIA [[id number]]     Order Status: Canceled Specimen: Stool     Stool Culture [[id number]]     Order Status: Sent Specimen: Stool              See below for Radiology    Scheduled Med:   amLODIPine  5 mg Oral BID    atenoloL  100 mg Oral BID    atorvastatin  10 mg Oral Daily    ciprofloxacin  400 mg Intravenous Q12H    doxazosin  4 mg Oral QHS    losartan  100 mg Oral Daily    metronidazole  500 mg Intravenous Q8H    pantoprazole  40 mg Oral BID AC      Continuous Infusions:     PRN Meds:  acetaminophen, acetaminophen, dextrose 10%, dextrose 10%, glucagon (human recombinant), glucose, glucose,  hydrALAZINE, HYDROcodone-acetaminophen, ondansetron     Assessment/Plan:  Acute on chronic diverticulitis   ? Sigmoid stricture  Bilateral nonspecific perinephric stranding  Small pericardial effusion and trace right pleural effusion  Normocytic anemia  History of diverticulitis, hypertension and hyperlipidemia    Plan:  Patient looks comfortable. Passing some flatus  Abdomen exam is benign  No fever or chills  Scheduled for C-scope in am   Current meds reviewed, cont iv abx for now   Reviewed today's labs and  WBC 5, hb 9.9, Plt 134, Na 139, K 3.4, Crt 1.02  Blood cultures negative so far   On clear liquids per GI team     Continue supportive care care     VTE prophylaxis: SCD    Patient condition:  Fair    Anticipated discharge and Disposition:   Home       All diagnosis and differential diagnosis have been reviewed; assessment and plan has been documented; I have personally reviewed the labs and test results that are presently available; I have reviewed the patients medication list; I have reviewed the consulting providers response and recommendations. I have reviewed or attempted to review medical records based upon their availability    All of the patient's questions have been  addressed and answered. Patient's is agreeable to the above stated plan. I will continue to monitor closely and make adjustments to medical management as needed.  _____________________________________________________________________    Nutrition Status:    Radiology:  I have personally reviewed the following imaging and agree with the radiologist.     Echo    Left Ventricle: There is normal systolic function with a visually   estimated ejection fraction of 60 - 65%. Grade I diastolic dysfunction.    Right Ventricle: Mild right ventricular enlargement. Systolic function   is normal.    Left Atrium: Left atrium is mildly dilated.    Right Atrium: Right atrium is mildly dilated.    Aortic Valve: The aortic valve is a trileaflet valve.     Mitral Valve: Mildly thickened leaflets. There is mitral annular   calcification present. There is no stenosis. The mean pressure gradient   across the mitral valve is 2 mmHg at a heart rate of  bpm. There is mild   regurgitation.    Tricuspid Valve: There is mild regurgitation.    Pulmonary Artery: There is mild pulmonary hypertension. The estimated   pulmonary artery systolic pressure is 47 mmHg.    IVC/SVC: Normal venous pressure at 3 mmHg.    Pericardium: There is a small effusion. No indication of cardiac   tamponade.  US Retroperitoneal Complete  Narrative: EXAMINATION:  US RETROPERITONEAL COMPLETE    CLINICAL HISTORY:  renal cysts vs other pathology CT unable to determine;    TECHNIQUE:  Ultrasound evaluation of the kidneys, region of the ureters, and urinary bladder.    COMPARISON:  CT 03/23/2024    FINDINGS:  No hydronephrosis. Two adjacent cysts within the lower left kidney measure up to 29 and 20 mm.  Ill-defined slightly hypoechoic 29 mm area in the upper left kidney may correspond with an additional left renal cyst on CT.  No suspicious renal lesion identified.    Bladder not visualized, likely decompressed.    Patent superior IVC.  No significant findings regarding the visualized proximal/mid aorta.  Distal aorta not visualized.    Measurements:    - Right kidney: 10.4 cm in length    - Left kidney: 9.7 cm in length  Impression: Few left renal cysts.  No suspicious renal lesion appreciated.    Electronically signed by: Trent Bryan  Date:    03/24/2024  Time:    15:05      Viral Cedeno MD  Department of Hospital Medicine   [facility]   03/25/2024

## 2024-03-26 ENCOUNTER — ANESTHESIA EVENT (OUTPATIENT)
Dept: ENDOSCOPY | Facility: HOSPITAL | Age: 69
DRG: 392 | End: 2024-03-26
Payer: MEDICARE

## 2024-03-26 ENCOUNTER — ANESTHESIA (OUTPATIENT)
Dept: ENDOSCOPY | Facility: HOSPITAL | Age: 69
DRG: 392 | End: 2024-03-26
Payer: MEDICARE

## 2024-03-26 LAB
ALBUMIN SERPL-MCNC: 3.3 G/DL (ref 3.4–4.8)
ALBUMIN/GLOB SERPL: 1.3 RATIO (ref 1.1–2)
ALP SERPL-CCNC: 46 UNIT/L (ref 40–150)
ALT SERPL-CCNC: 15 UNIT/L (ref 0–55)
AST SERPL-CCNC: 20 UNIT/L (ref 5–34)
BILIRUB SERPL-MCNC: 0.3 MG/DL
BUN SERPL-MCNC: 11 MG/DL (ref 9.8–20.1)
CALCIUM SERPL-MCNC: 8.8 MG/DL (ref 8.4–10.2)
CHLORIDE SERPL-SCNC: 112 MMOL/L (ref 98–107)
CO2 SERPL-SCNC: 21 MMOL/L (ref 23–31)
CREAT SERPL-MCNC: 1.02 MG/DL (ref 0.55–1.02)
GFR SERPLBLD CREATININE-BSD FMLA CKD-EPI: 59 MLS/MIN/1.73/M2
GLOBULIN SER-MCNC: 2.5 GM/DL (ref 2.4–3.5)
GLUCOSE SERPL-MCNC: 89 MG/DL (ref 82–115)
POTASSIUM SERPL-SCNC: 3.5 MMOL/L (ref 3.5–5.1)
PROT SERPL-MCNC: 5.8 GM/DL (ref 5.8–7.6)
SODIUM SERPL-SCNC: 140 MMOL/L (ref 136–145)

## 2024-03-26 PROCEDURE — D9220A PRA ANESTHESIA: Mod: ANES,,, | Performed by: ANESTHESIOLOGY

## 2024-03-26 PROCEDURE — 25000003 PHARM REV CODE 250: Performed by: ANESTHESIOLOGY

## 2024-03-26 PROCEDURE — 25000003 PHARM REV CODE 250: Performed by: PHYSICIAN ASSISTANT

## 2024-03-26 PROCEDURE — 11000001 HC ACUTE MED/SURG PRIVATE ROOM

## 2024-03-26 PROCEDURE — 88305 TISSUE EXAM BY PATHOLOGIST: CPT | Performed by: INTERNAL MEDICINE

## 2024-03-26 PROCEDURE — 63600175 PHARM REV CODE 636 W HCPCS: Mod: JZ,JG | Performed by: STUDENT IN AN ORGANIZED HEALTH CARE EDUCATION/TRAINING PROGRAM

## 2024-03-26 PROCEDURE — 63600175 PHARM REV CODE 636 W HCPCS: Performed by: NURSE ANESTHETIST, CERTIFIED REGISTERED

## 2024-03-26 PROCEDURE — 80053 COMPREHEN METABOLIC PANEL: CPT | Performed by: PHYSICIAN ASSISTANT

## 2024-03-26 PROCEDURE — D9220A PRA ANESTHESIA: Mod: CRNA,,, | Performed by: NURSE ANESTHETIST, CERTIFIED REGISTERED

## 2024-03-26 PROCEDURE — 37000008 HC ANESTHESIA 1ST 15 MINUTES: Performed by: INTERNAL MEDICINE

## 2024-03-26 PROCEDURE — 25000003 PHARM REV CODE 250: Performed by: HOSPITALIST

## 2024-03-26 PROCEDURE — 45385 COLONOSCOPY W/LESION REMOVAL: CPT | Performed by: INTERNAL MEDICINE

## 2024-03-26 PROCEDURE — 37000009 HC ANESTHESIA EA ADD 15 MINS: Performed by: INTERNAL MEDICINE

## 2024-03-26 PROCEDURE — 27201423 OPTIME MED/SURG SUP & DEVICES STERILE SUPPLY: Performed by: INTERNAL MEDICINE

## 2024-03-26 PROCEDURE — 0DBK8ZZ EXCISION OF ASCENDING COLON, VIA NATURAL OR ARTIFICIAL OPENING ENDOSCOPIC: ICD-10-PCS | Performed by: INTERNAL MEDICINE

## 2024-03-26 PROCEDURE — 0DBL8ZZ EXCISION OF TRANSVERSE COLON, VIA NATURAL OR ARTIFICIAL OPENING ENDOSCOPIC: ICD-10-PCS | Performed by: INTERNAL MEDICINE

## 2024-03-26 PROCEDURE — 25000003 PHARM REV CODE 250: Performed by: NURSE ANESTHETIST, CERTIFIED REGISTERED

## 2024-03-26 RX ORDER — SODIUM CHLORIDE, SODIUM GLUCONATE, SODIUM ACETATE, POTASSIUM CHLORIDE AND MAGNESIUM CHLORIDE 30; 37; 368; 526; 502 MG/100ML; MG/100ML; MG/100ML; MG/100ML; MG/100ML
INJECTION, SOLUTION INTRAVENOUS CONTINUOUS
Status: DISCONTINUED | OUTPATIENT
Start: 2024-03-26 | End: 2024-03-27

## 2024-03-26 RX ORDER — ONDANSETRON HYDROCHLORIDE 2 MG/ML
4 INJECTION, SOLUTION INTRAVENOUS DAILY PRN
Status: DISCONTINUED | OUTPATIENT
Start: 2024-03-26 | End: 2024-03-27 | Stop reason: HOSPADM

## 2024-03-26 RX ORDER — LIDOCAINE HYDROCHLORIDE 20 MG/ML
INJECTION, SOLUTION EPIDURAL; INFILTRATION; INTRACAUDAL; PERINEURAL
Status: DISPENSED
Start: 2024-03-26 | End: 2024-03-27

## 2024-03-26 RX ORDER — PROPOFOL 10 MG/ML
VIAL (ML) INTRAVENOUS
Status: COMPLETED
Start: 2024-03-26 | End: 2024-03-26

## 2024-03-26 RX ORDER — IPRATROPIUM BROMIDE AND ALBUTEROL SULFATE 2.5; .5 MG/3ML; MG/3ML
3 SOLUTION RESPIRATORY (INHALATION)
Status: DISCONTINUED | OUTPATIENT
Start: 2024-03-26 | End: 2024-03-27 | Stop reason: HOSPADM

## 2024-03-26 RX ORDER — LIDOCAINE HYDROCHLORIDE 20 MG/ML
INJECTION INTRAVENOUS
Status: DISCONTINUED | OUTPATIENT
Start: 2024-03-26 | End: 2024-03-26

## 2024-03-26 RX ORDER — PROCHLORPERAZINE EDISYLATE 5 MG/ML
5 INJECTION INTRAMUSCULAR; INTRAVENOUS EVERY 30 MIN PRN
Status: DISCONTINUED | OUTPATIENT
Start: 2024-03-26 | End: 2024-03-27 | Stop reason: HOSPADM

## 2024-03-26 RX ORDER — LIDOCAINE HYDROCHLORIDE 10 MG/ML
1 INJECTION, SOLUTION EPIDURAL; INFILTRATION; INTRACAUDAL; PERINEURAL ONCE
Status: DISCONTINUED | OUTPATIENT
Start: 2024-03-26 | End: 2024-03-27 | Stop reason: HOSPADM

## 2024-03-26 RX ORDER — PROPOFOL 10 MG/ML
VIAL (ML) INTRAVENOUS
Status: DISCONTINUED | OUTPATIENT
Start: 2024-03-26 | End: 2024-03-26

## 2024-03-26 RX ORDER — AMLODIPINE BESYLATE 5 MG/1
10 TABLET ORAL 2 TIMES DAILY
Status: DISCONTINUED | OUTPATIENT
Start: 2024-03-26 | End: 2024-03-27 | Stop reason: HOSPADM

## 2024-03-26 RX ORDER — ONDANSETRON 4 MG/1
8 TABLET, ORALLY DISINTEGRATING ORAL EVERY 6 HOURS PRN
Status: DISCONTINUED | OUTPATIENT
Start: 2024-03-26 | End: 2024-03-27 | Stop reason: HOSPADM

## 2024-03-26 RX ADMIN — SODIUM CHLORIDE, SODIUM GLUCONATE, SODIUM ACETATE, POTASSIUM CHLORIDE AND MAGNESIUM CHLORIDE: 526; 502; 368; 37; 30 INJECTION, SOLUTION INTRAVENOUS at 03:03

## 2024-03-26 RX ADMIN — PROPOFOL 30 MG: 10 INJECTION, EMULSION INTRAVENOUS at 03:03

## 2024-03-26 RX ADMIN — SODIUM CHLORIDE, SODIUM GLUCONATE, SODIUM ACETATE, POTASSIUM CHLORIDE AND MAGNESIUM CHLORIDE: 526; 502; 368; 37; 30 INJECTION, SOLUTION INTRAVENOUS at 02:03

## 2024-03-26 RX ADMIN — CIPROFLOXACIN 400 MG: 2 INJECTION, SOLUTION INTRAVENOUS at 09:03

## 2024-03-26 RX ADMIN — LIDOCAINE HYDROCHLORIDE 100 MG: 20 INJECTION INTRAVENOUS at 03:03

## 2024-03-26 RX ADMIN — AMLODIPINE BESYLATE 10 MG: 5 TABLET ORAL at 09:03

## 2024-03-26 RX ADMIN — METRONIDAZOLE 500 MG: 5 INJECTION, SOLUTION INTRAVENOUS at 10:03

## 2024-03-26 RX ADMIN — ACETAMINOPHEN 650 MG: 325 TABLET, FILM COATED ORAL at 09:03

## 2024-03-26 RX ADMIN — METRONIDAZOLE 500 MG: 5 INJECTION, SOLUTION INTRAVENOUS at 03:03

## 2024-03-26 RX ADMIN — PROPOFOL 60 MG: 10 INJECTION, EMULSION INTRAVENOUS at 03:03

## 2024-03-26 RX ADMIN — PROPOFOL 20 MG: 10 INJECTION, EMULSION INTRAVENOUS at 03:03

## 2024-03-26 RX ADMIN — ATENOLOL 100 MG: 50 TABLET ORAL at 09:03

## 2024-03-26 RX ADMIN — DOXAZOSIN 4 MG: 4 TABLET ORAL at 09:03

## 2024-03-26 NOTE — PROGRESS NOTES
[facility] Hospital Medicine Progress Note        Chief Complaint: Inpatient Follow-up     HPI:   68 y.o. female with a past medical history of essential hypertension, hyperlipidemia, and diverticulitis who presented to [facility] on 3/23/2024 with c/o abdominal pain.  Patient reported lower abdominal pain with diarrhea.  Patient reported 2 episodes of non-bloody diarrhea.  Patient denied nausea, vomiting, fever, chills, dysuria, hematuria, rectal bleeding, dark stools, chest pain, and shortness of breath.  Initial vital signs in ED were /119, pulse 67, respirations 16, temperature 36.7° C, and SpO2 98% on room air.  Labs revealed WBC 7.90, RBC 4.04, hemoglobin 11.9, hematocrit 36.7, chloride 109,  BUN 20.4, and creatinine 0.92.  UA revealed 1+ protein.  CT abdomen and pelvis with IV contrast revealed bilateral nonspecific perinephric stranding which may be chronic with recommendations of correlation for UTI, long segment of sigmoid colon narrowing probably the result of chronic diverticulitis with pathological stricture difficult to exclude, and small pericardial effusion and trace right pleural effusion.  Chest x-ray revealed no acute cardiopulmonary process identified.  GI was consulted with plans for colonoscopy.  Patient was given IV Zofran 4 mg, IV hydralazine 10 mg, and IV morphine 4 mg in ED. Patient was admitted to hospital medicine service for further medical management.      Seen by GI team and recommended IV abx and C-scope.     Interval Hx:   No significant changes overnight.  Patient doing well.  Plan for colonoscopy this morning.  Denies any significant complaints     Objective/physical exam:  General: In no acute distress  Chest: Clear to auscultation bilaterally  Heart: RRR, +S1, S2, no appreciable murmur  Abdomen: Soft, nontender, BS +  MSK: Warm, no lower extremity edema, no clubbing or cyanosis  Neurologic: Alert and oriented x4, Cranial nerve II-XII intact,  Strength 5/5 in all 4 extremities       VITAL SIGNS: 24 HRS MIN & MAX LAST   Temp  Min: 98.1 °F (36.7 °C)  Max: 99 °F (37.2 °C) 98.2 °F (36.8 °C)   BP  Min: 141/76  Max: 170/76 (!) 141/76   Pulse  Min: 55  Max: 70  70   Resp  Min: 18  Max: 18 18   SpO2  Min: 95 %  Max: 99 % 98 %       Recent Labs   Lab 03/23/24  1612 03/25/24  0416   WBC 7.90 5.33   RBC 4.04* 3.39*   HGB 11.9* 9.9*   HCT 36.7* 30.5*   MCV 90.8 90.0   MCH 29.5 29.2   MCHC 32.4* 32.5*   RDW 13.4 13.6    134   MPV 11.7* 11.4*       Recent Labs   Lab 03/23/24  1611 03/25/24 0416 03/26/24  0439    139 140   K 3.9 3.4* 3.5   CO2 24 22* 21*   BUN 20.4* 13.5 11.0   CREATININE 0.92 1.02 1.02   CALCIUM 9.2 8.6 8.8   ALBUMIN 4.0 3.1* 3.3*   ALKPHOS 56 44 46   ALT 21 16 15   AST 20 18 20   BILITOT 0.6 0.4 0.3          Microbiology Results (last 7 days)       Procedure Component Value Units Date/Time    Blood Culture [[id number]]  (Normal) Collected: 03/24/24 1135    Order Status: Completed Specimen: Blood Updated: 03/25/24 1301     CULTURE, BLOOD ([facility]) No Growth At 24 Hours    Blood Culture [[id number]]  (Normal) Collected: 03/24/24 1135    Order Status: Completed Specimen: Blood Updated: 03/25/24 1301     CULTURE, BLOOD ([facility]) No Growth At 24 Hours    Clostridium Diff Toxin, A & B, EIA [[id number]]     Order Status: Canceled Specimen: Stool     Stool Culture [[id number]]     Order Status: Canceled Specimen: Stool              Radiology:  Echo    Left Ventricle: There is normal systolic function with a visually   estimated ejection fraction of 60 - 65%. Grade I diastolic dysfunction.    Right Ventricle: Mild right ventricular enlargement. Systolic function   is normal.    Left Atrium: Left atrium is mildly dilated.    Right Atrium: Right atrium is mildly dilated.    Aortic Valve: The aortic valve is a trileaflet valve.    Mitral Valve: Mildly thickened leaflets. There is mitral annular   calcification present. There is no stenosis. The mean  pressure gradient   across the mitral valve is 2 mmHg at a heart rate of  bpm. There is mild   regurgitation.    Tricuspid Valve: There is mild regurgitation.    Pulmonary Artery: There is mild pulmonary hypertension. The estimated   pulmonary artery systolic pressure is 47 mmHg.    IVC/SVC: Normal venous pressure at 3 mmHg.    Pericardium: There is a small effusion. No indication of cardiac   tamponade.  US Retroperitoneal Complete  Narrative: EXAMINATION:  US RETROPERITONEAL COMPLETE    CLINICAL HISTORY:  renal cysts vs other pathology CT unable to determine;    TECHNIQUE:  Ultrasound evaluation of the kidneys, region of the ureters, and urinary bladder.    COMPARISON:  CT 03/23/2024    FINDINGS:  No hydronephrosis. Two adjacent cysts within the lower left kidney measure up to 29 and 20 mm.  Ill-defined slightly hypoechoic 29 mm area in the upper left kidney may correspond with an additional left renal cyst on CT.  No suspicious renal lesion identified.    Bladder not visualized, likely decompressed.    Patent superior IVC.  No significant findings regarding the visualized proximal/mid aorta.  Distal aorta not visualized.    Measurements:    - Right kidney: 10.4 cm in length    - Left kidney: 9.7 cm in length  Impression: Few left renal cysts.  No suspicious renal lesion appreciated.    Electronically signed by: Trent Bryan  Date:    03/24/2024  Time:    15:05      Scheduled Med:   amLODIPine  5 mg Oral BID    atenoloL  100 mg Oral BID    atorvastatin  10 mg Oral Daily    ciprofloxacin  400 mg Intravenous Q12H    doxazosin  4 mg Oral QHS    losartan  100 mg Oral Daily    metronidazole  500 mg Intravenous Q8H    pantoprazole  40 mg Oral BID AC        Continuous Infusions:       PRN Meds:  acetaminophen, acetaminophen, dextrose 10%, dextrose 10%, glucagon (human recombinant), glucose, glucose, hydrALAZINE, HYDROcodone-acetaminophen, ondansetron       Assessment/Plan:   Acute on chronic diverticulitis   ? Sigmoid  stricture  Bilateral nonspecific perinephric stranding  Small pericardial effusion and trace right pleural effusion  Normocytic anemia  History of diverticulitis, hypertension and hyperlipidemia    Patient going for colonoscopy today.  NPO.  Tolerated prep.  Denies any ongoing bleeding.  Vital signs are stable overnight.  She remains on oral ciprofloxacin and Flagyl day 3.    BNP returned this morning.  Mild metabolic acidosis but renal function stable.  She was correct once she was eating.  Plan to advance diet after her procedure.  Hemoglobin has remained stable.    Blood pressure is elevated.  She was requiring IV antihypertensives overnight.  Will continue to adjust her oral regimen as needed.      Critical care diagnosis: hypertensive urgency requiring iv antihypertensives  Critical care interventions: hands on evaluation, review of labs/radiographs/records and discussions with family  Critical care time spent: >32 minutes        Norm Cortez MD   03/26/2024     All diagnosis and differential diagnosis have been reviewed; assessment and plan has been documented; I have personally reviewed the labs and test results that are presently available; I have reviewed the patients medication list; I have reviewed the consulting providers response and recommendations. I have reviewed or attempted to review medical records based upon their availability    All of the patient's questions have been  addressed and answered. Patient's is agreeable to the above stated plan. I will continue to monitor closely and make adjustments to medical management as needed.  _____________________________________________________________________

## 2024-03-26 NOTE — PROGRESS NOTES
Kera Hoffman   MRN: [id number]   ADMISSION DATE: 3/23/2024  : 1955  AGE: 68 y.o.    DATE :  2024     PROVIDER: CHAO MCKENZIE    SUBJECTIVE:  Denies any significant complaints.  Colon prep in progress.    Review of Systems   Awake and alert.  Denies any significant cardiopulmonary symptoms.  Colon prep in progress.  No fever or chills.  No nausea or vomiting    Review of patient's allergies indicates:  No Known Allergies      amLODIPine  5 mg Oral BID    atenoloL  100 mg Oral BID    atorvastatin  10 mg Oral Daily    ciprofloxacin  400 mg Intravenous Q12H    doxazosin  4 mg Oral QHS    losartan  100 mg Oral Daily    metronidazole  500 mg Intravenous Q8H    pantoprazole  40 mg Oral BID AC       Medications Discontinued During This Encounter   Medication Reason    lactated ringers infusion     ciprofloxacin (CIPRO)400mg/200ml D5W IVPB 400 mg     metronidazole IVPB 500 mg              Past Medical History:   Diagnosis Date    Hypertension       Social History     Socioeconomic History    Marital status:    Tobacco Use    Smoking status: Never    Smokeless tobacco: Never   Substance and Sexual Activity    Alcohol use: Not Currently    Drug use: Never      Past Surgical History:   Procedure Laterality Date    HYSTERECTOMY          OBJECTIVE:     Vitals:    24 0907 24 1153 24 1609 24   BP: (!) 150/77 (!) 169/82 (!) 148/74 (!) 156/74   Pulse:  (!) 55 68 60   Resp:   18 18   Temp:  98.8 °F (37.1 °C) 98.1 °F (36.7 °C) 98.3 °F (36.8 °C)   TempSrc:  Oral  Oral   SpO2:  95% 95% 97%   Weight:       Height:           Physical Exam   HEENT examination:  Unremarkable   Neck:  Supple   Chest:  Decreased breath sounds bilaterally   Heart examination:  S1, S2 audible   Abdomen: Bowel sounds positive, soft.  Obese.    Extremities:  With some edema bilaterally    LABS    Recent Labs   Lab 24  1612 24  0416   WBC 7.90 5.33   HGB 11.9* 9.9*   HCT 36.7* 30.5*    134     "  Recent Labs   Lab 03/23/24  1611 03/25/24  0416    139   K 3.9 3.4*   CO2 24 22*   BUN 20.4* 13.5   CREATININE 0.92 1.02   CALCIUM 9.2 8.6   BILITOT 0.6 0.4   ALKPHOS 56 44   ALT 21 16   AST 20 18   GLUCOSE 79* 80*      Recent Labs   Lab 03/23/24 2120   INR 1.1    No results for input(s): "AMYLASE" in the last 168 hours.   Recent Labs   Lab 03/23/24 2120   INR 1.1   PTT 28.5           RESULTS: Echo    Result Date: 3/24/2024    Left Ventricle: There is normal systolic function with a visually estimated ejection fraction of 60 - 65%. Grade I diastolic dysfunction.   Right Ventricle: Mild right ventricular enlargement. Systolic function is normal.   Left Atrium: Left atrium is mildly dilated.   Right Atrium: Right atrium is mildly dilated.   Aortic Valve: The aortic valve is a trileaflet valve.   Mitral Valve: Mildly thickened leaflets. There is mitral annular calcification present. There is no stenosis. The mean pressure gradient across the mitral valve is 2 mmHg at a heart rate of  bpm. There is mild regurgitation.   Tricuspid Valve: There is mild regurgitation.   Pulmonary Artery: There is mild pulmonary hypertension. The estimated pulmonary artery systolic pressure is 47 mmHg.   IVC/SVC: Normal venous pressure at 3 mmHg.   Pericardium: There is a small effusion. No indication of cardiac tamponade.     US Retroperitoneal Complete    Result Date: 3/24/2024  EXAMINATION: US RETROPERITONEAL COMPLETE CLINICAL HISTORY: renal cysts vs other pathology CT unable to determine; TECHNIQUE: Ultrasound evaluation of the kidneys, region of the ureters, and urinary bladder. COMPARISON: CT 03/23/2024 FINDINGS: No hydronephrosis. Two adjacent cysts within the lower left kidney measure up to 29 and 20 mm.  Ill-defined slightly hypoechoic 29 mm area in the upper left kidney may correspond with an additional left renal cyst on CT.  No suspicious renal lesion identified. Bladder not visualized, likely decompressed. Patent " superior IVC.  No significant findings regarding the visualized proximal/mid aorta.  Distal aorta not visualized. Measurements: - Right kidney: 10.4 cm in length - Left kidney: 9.7 cm in length     Few left renal cysts.  No suspicious renal lesion appreciated. Electronically signed by: Trent Bryan Date:    03/24/2024 Time:    15:05    X-Ray Chest AP Portable    Result Date: 3/23/2024  EXAMINATION: XR CHEST AP PORTABLE CLINICAL HISTORY: preop exam; TECHNIQUE: One view COMPARISON: None available. FINDINGS: Cardiopericardial silhouette enlarged appearance is similar.  Lungs are without dense focal or segmental consolidation, congestive process, pleural effusions or pneumothorax.     NO ACUTE CARDIOPULMONARY PROCESS IDENTIFIED. Electronically signed by: Murali Ramirez Date:    03/23/2024 Time:    21:59    CT Abdomen Pelvis With IV Contrast NO Oral Contrast    Result Date: 3/23/2024  EXAMINATION: CT ABDOMEN PELVIS WITH IV CONTRAST CLINICAL HISTORY: lower abdominal pain; TECHNIQUE: Multidetector axial images were obtained of the abdomen and pelvis following the administration of IV contrast. Oral contrast was not administered. Dose length product of 683 mGycm. Automated exposure control was utilized to minimize radiation dose. COMPARISON: None available. FINDINGS: Visualized portion of the heart is enlarged in size and there is partially visualized pericardial effusion with maximum thickness of 1.4 cm.  There is trace of fluid within the right dependent pleural space.  No acute infiltrates or congestive opacities of imaged portion of the lungs. Hepatic volume and attenuation is unremarkable. Gallbladder wall is not thickened and there is no intra luminal calcified calculus. No biliary dilation identified. Pancreatic unremarkable attenuation without acute peripancreatic phlegmons. Main pancreatic duct is not dilated. Spleen is of normal size without focal lesion. Right adrenal gland is unremarkable.  There is slight  thickening of the left adrenal gland.  Bilateral kidneys lobular contour is consistent with scarring.  The left kidney up to 2.5 cm hypodensities which are favored to be cysts and for these no specific imaging is recommended.  There are additional very small kidneys hypodensities which may again represent cysts but are small to characterize and can be further assessed with ultrasound exam.  There are perinephric strandings combined minimal amount of fluid.  No kidneys collecting system dilatation and there is no hydroureter.  There are no retroperitoneal periaortic enlarged lymph nodes. Stomach is decompressed and difficult to assess.  Small hiatal hernia.  Distal esophageal mild mural thickening may be the result of reflux disease.  No abnormal dilatation of the loops of small bowel.  Appendix is nondilated.  There is diverticulosis coli with sigmoid colon predominance.  There is irregular luminal narrowing of the proximal portion of the sigmoid colon from image 91 to image 98 series 2 which may be the result of chronic diverticulitis.  Possibility of pathologic stricture is difficult to exclude on this exam.  Please correlate patient is up-to-date on colonoscopy.  There are no convincing findings of acute diverticulitis and there is no bowel obstruction, free air or free fluid. Urinary bladder appears within normal limits. There is no pelvic free fluid.  There is right pelvic intramuscular lipoma There is grade 1 anterolisthesis of L4 on L5.  Multilevel thoracolumbar degenerative changes.  Demineralization of the bones.  Chronic deformity of the left iliac bone.  No acute or otherwise osseous abnormality identified.     1. Bilateral kidneys scarring and hypodense cystic structure discussed above.  Bilateral nonspecific perinephric strandings.  These may be chronic.  Please correlate clinically there is no acute urinary tract infection. 2. Long segment of sigmoid colon narrowing probably is the result of chronic  diverticulitis.  A pathologic stricture is difficult to exclude on this exam.  Please correlate, patient is up-to-date on colonoscopy.  No convincing findings of acute diverticulitis. 3.  Small pericardial effusion and trace of right pleural effusion. Electronically signed by: Murali Ramirez Date:    03/23/2024 Time:    19:16             ICD-10-CM ICD-9-CM   1. Lower abdominal pain  R10.30 789.09   2. Pre-op exam  Z01.818 V72.84   3. Stricture of sigmoid colon  K56.699 560.9   4. Pericardial effusion  I31.39 423.9          ASSESSMENT & PLAN:   eKra Hoffman is a 68 y.o. female with a past medical history of essential hypertension, hyperlipidemia, and diverticulitis who presented to [facility] on 3/23/2024 with c/o abdominal pain.  Patient reported lower abdominal pain with diarrhea.  Patient reported 2 episodes of non-bloody diarrhea.  Patient denied nausea, vomiting, fever, chills, dysuria, hematuria, rectal bleeding, dark stools, chest pain, and shortness of breath.  Initial vital signs in ED were /119, pulse 67, respirations 16, temperature 36.7° C, and SpO2 98% on room air.  Labs revealed WBC 7.90, RBC 4.04, hemoglobin 11.9, hematocrit 36.7, chloride 109,  BUN 20.4, and creatinine 0.92.  UA revealed 1+ protein.  CT abdomen and pelvis with IV contrast revealed bilateral nonspecific perinephric stranding which may be chronic with recommendations of correlation for UTI, long segment of sigmoid colon narrowing probably the result of chronic diverticulitis with pathological stricture difficult to exclude, and small pericardial effusion and trace right pleural effusion.  Chest x-ray revealed no acute cardiopulmonary process identified.  GI was consulted with plans for colonoscopy.  Patient was given IV Zofran 4 mg, IV hydralazine 10 mg, and IV morphine 4 mg in ED. Patient was admitted to hospital medicine service for further medical management.      Patient reports last colonoscopy done in [place] per   Mary approximately 15-20 years ago.  No records available.  She has not seen any gastroenterologist in the past.     Recommendations:  1. Full liquid diet today.  Clear liquid diet starting tomorrow.    2. Agree with IV antibiotic as well as IV fluids.    3. Patient reported to have a low-grade fever today.  Plan for lower GI endoscopy (flexible sigmoidoscopy versus colonoscopy) possibly on Tuesday.  Procedures have been discussed with the patient in details.  She agrees and would like to proceed.  Family members were present as well.  4. Monitor for any additional febrile episodes.    3/25/24  Patient seems to be doing well from GI standpoint.  Colonoscopy prep in progress.  Plan for colonoscopy tomorrow.  Procedure has been discussed in detail with the patient.  Also discussed with the family members.

## 2024-03-26 NOTE — ANESTHESIA POSTPROCEDURE EVALUATION
Anesthesia Post Evaluation    Patient: Kera Hoffman    Procedure(s) Performed: Procedure(s):  COLONOSCOPY, WITH POLYPECTOMY USING SNARE    Final Anesthesia Type: general      Patient location during evaluation: PACU  Patient participation: Yes- Able to Participate  Level of consciousness: awake and alert  Post-procedure vital signs: reviewed and stable  Pain management: adequate  Airway patency: patent      Anesthetic complications: no      Cardiovascular status: hemodynamically stable  Respiratory status: unassisted  Hydration status: euvolemic  Follow-up not needed.              Vitals Value Taken Time   /56 03/26/24 1624   Temp 36.7 °C (98.1 °F) 03/26/24 1616   Pulse 66 03/26/24 1624   Resp 16 03/26/24 1624   SpO2 98 % 03/26/24 1624         No case tracking events are documented in the log.      Pain/Mandi Score: Mandi Score: 10 (3/26/2024  4:22 PM)

## 2024-03-26 NOTE — TRANSFER OF CARE
"Anesthesia Transfer of Care Note    Patient: Kera Hoffman    Procedure(s) Performed: Procedure(s):  COLONOSCOPY, WITH POLYPECTOMY USING SNARE    Patient location: GI    Anesthesia Type: general    Transport from OR: Transported from OR on room air with adequate spontaneous ventilation    Post pain: adequate analgesia    Post assessment: no apparent anesthetic complications and tolerated procedure well    Post vital signs: stable    Level of consciousness: awake, alert and oriented    Nausea/Vomiting: no nausea/vomiting    Complications: none    Transfer of care protocol was followed      Last vitals: Visit Vitals  /63   Pulse 65   Temp 36.4 °C (97.5 °F) (Tympanic)   Resp 17   Ht 5' 5" (1.651 m)   Wt 88 kg (194 lb)   SpO2 100%   Breastfeeding No   BMI 32.28 kg/m²     "

## 2024-03-26 NOTE — ANESTHESIA PREPROCEDURE EVALUATION
03/26/2024  Kera Hoffman is a 68 y.o., female admitted March 20 after presenting with abdominal pain with diarrhea.  Further workup revealed acute on chronic diverticulitis.  Patient presents today for colonoscopy.  Patient notes improvement abdominal symptoms and no trouble with bowel movements with the prep    Transthoracic echo March 24, 2024   EF 60% grade 1 diastolic dysfunction   Mild MR/TR   PA systolic pressure 47  Small pericardial effusion without tamponade      Pre-op Assessment    I have reviewed the Patient Summary Reports.    I have reviewed the NPO Status.   I have reviewed the Medications.     Review of Systems  Anesthesia Hx:               Denies Personal Hx of Anesthesia complications.                    Social:  Non-Smoker       Hematology/Oncology:       -- Anemia:                                  Cardiovascular:     Hypertension                Functional Capacity good / => 4 METS                         Endocrine:        Obesity / BMI > 30      Physical Exam  General: Well nourished, Cooperative, Alert and Oriented    Airway:  Mallampati: II   Mouth Opening: Normal  TM Distance: Normal  Tongue: Normal  Neck ROM: Normal ROM    Dental:  Intact    Chest/Lungs:  Clear to auscultation, Normal Respiratory Rate    Heart:  Rate: Normal  Rhythm: Regular Rhythm        Anesthesia Plan  Type of Anesthesia, risks & benefits discussed:    Anesthesia Type: Gen Natural Airway  Intra-op Monitoring Plan: Standard ASA Monitors  Induction:  IV  Informed Consent: Informed consent signed with the Patient and all parties understand the risks and agree with anesthesia plan.  All questions answered.   ASA Score: 2  Day of Surgery Review of History & Physical: H&P Update referred to the surgeon/provider.    Ready For Surgery From Anesthesia Perspective.     .

## 2024-03-26 NOTE — PROCEDURES
Kera Hoffman   MRN: [id number]   ADMISSION DATE: 3/23/2024  : 1955  AGE: 68 y.o.    PROCEDURE:  Colonoscopy with snare polypectomy    DATE OF PROCEDURE:  2024     SURGEON: CHAO MCKENZIE    PREOPERATIVE DIAGNOSIS:  1.  Abdominal pain  2. Abnormal CT scan with left colonic narrowing.  3. History of diarrhea    POSTOPERATIVE DIAGNOSIS:.  Colon polyps, 4-6 mm, ascending and transverse colon.  Snare polypectomy was done.    2. Small polyp, left colon.  Snare polypectomy was done.  Could not be retrieved.  3. Left-sided colonic diverticula   4. No evidence of colonic stricture or any inflammation or ulceration.  5. Internal hemorrhoids, grade 1-2.  Otherwise normal exam.    HISTORY AND PHYSICAL:  As per preoperative note.      DESCRIPTION OF PROCEDURE:  Informed consent was obtained.  Patient was placed in left lateral position.  Sedation per anesthesia service.  Rectal exam was unremarkable.  Olympus video colonoscope was introduced into the rectum and was carefully advanced to cecum.  Quality of the preparation was satisfactory   Patient tolerated the procedure well without any complications.    FINDINGS:  There were scattered diverticula noted in left colon.  Photodocumentation was obtained.  There were 2 polyps noted in ascending colon ranging in size from 4-6 mm.  Hot snare polypectomy was done.  There was good hemostasis.  There were 2 additional polyps ranging in size from 4 to 5 mm noted in transverse colon.  Hot snare polypectomy was done with good hemostasis.  There was a 3-4 mm polyp noted in left colon.  Hot snare polypectomy was done.  Polyp could not be retrieved.  There were scattered diverticula noted in left colon.  Photodocumentation was obtained.  There were grade 1-2 internal hemorrhoids noted on withdrawal of the scope.  Estimated withdrawal time from cecum to rectum was more than 15 minutes    ESTIMATED BLOOD LOSS:  None.    COMPLICATIONS:  None.    RECOMMENDATIONS:  1. Follow up  biopsy results.  2. Repeat colonoscopy in 3 to 5 years pending biopsy results  3. Advance diet as tolerated.  Patient can follow up with me on discharge in 3-4 weeks

## 2024-03-27 VITALS
SYSTOLIC BLOOD PRESSURE: 135 MMHG | BODY MASS INDEX: 32.32 KG/M2 | DIASTOLIC BLOOD PRESSURE: 79 MMHG | WEIGHT: 194 LBS | RESPIRATION RATE: 17 BRPM | OXYGEN SATURATION: 100 % | HEIGHT: 65 IN | TEMPERATURE: 98 F | HEART RATE: 69 BPM

## 2024-03-27 LAB
ALBUMIN SERPL-MCNC: 3.1 G/DL (ref 3.4–4.8)
ALBUMIN/GLOB SERPL: 1.3 RATIO (ref 1.1–2)
ALP SERPL-CCNC: 43 UNIT/L (ref 40–150)
ALT SERPL-CCNC: 15 UNIT/L (ref 0–55)
AST SERPL-CCNC: 20 UNIT/L (ref 5–34)
BASOPHILS # BLD AUTO: 0.02 X10(3)/MCL
BASOPHILS NFR BLD AUTO: 0.3 %
BILIRUB SERPL-MCNC: 0.3 MG/DL
BUN SERPL-MCNC: 11.6 MG/DL (ref 9.8–20.1)
CALCIUM SERPL-MCNC: 8.3 MG/DL (ref 8.4–10.2)
CHLORIDE SERPL-SCNC: 110 MMOL/L (ref 98–107)
CO2 SERPL-SCNC: 22 MMOL/L (ref 23–31)
CREAT SERPL-MCNC: 1.09 MG/DL (ref 0.55–1.02)
EOSINOPHIL # BLD AUTO: 0.08 X10(3)/MCL (ref 0–0.9)
EOSINOPHIL NFR BLD AUTO: 1.3 %
ERYTHROCYTE [DISTWIDTH] IN BLOOD BY AUTOMATED COUNT: 13.5 % (ref 11.5–17)
GFR SERPLBLD CREATININE-BSD FMLA CKD-EPI: 55 MLS/MIN/1.73/M2
GLOBULIN SER-MCNC: 2.3 GM/DL (ref 2.4–3.5)
GLUCOSE SERPL-MCNC: 90 MG/DL (ref 82–115)
HCT VFR BLD AUTO: 29.8 % (ref 37–47)
HGB BLD-MCNC: 9.8 G/DL (ref 12–16)
IMM GRANULOCYTES # BLD AUTO: 0.01 X10(3)/MCL (ref 0–0.04)
IMM GRANULOCYTES NFR BLD AUTO: 0.2 %
LYMPHOCYTES # BLD AUTO: 1.89 X10(3)/MCL (ref 0.6–4.6)
LYMPHOCYTES NFR BLD AUTO: 31.3 %
MCH RBC QN AUTO: 29 PG (ref 27–31)
MCHC RBC AUTO-ENTMCNC: 32.9 G/DL (ref 33–36)
MCV RBC AUTO: 88.2 FL (ref 80–94)
MONOCYTES # BLD AUTO: 0.6 X10(3)/MCL (ref 0.1–1.3)
MONOCYTES NFR BLD AUTO: 10 %
NEUTROPHILS # BLD AUTO: 3.43 X10(3)/MCL (ref 2.1–9.2)
NEUTROPHILS NFR BLD AUTO: 56.9 %
NRBC BLD AUTO-RTO: 0 %
PLATELET # BLD AUTO: 155 X10(3)/MCL (ref 130–400)
PMV BLD AUTO: 11.3 FL (ref 7.4–10.4)
POTASSIUM SERPL-SCNC: 3.5 MMOL/L (ref 3.5–5.1)
PROT SERPL-MCNC: 5.4 GM/DL (ref 5.8–7.6)
RBC # BLD AUTO: 3.38 X10(6)/MCL (ref 4.2–5.4)
SODIUM SERPL-SCNC: 140 MMOL/L (ref 136–145)
WBC # SPEC AUTO: 6.03 X10(3)/MCL (ref 4.5–11.5)

## 2024-03-27 PROCEDURE — 25000003 PHARM REV CODE 250: Performed by: PHYSICIAN ASSISTANT

## 2024-03-27 PROCEDURE — 85025 COMPLETE CBC W/AUTO DIFF WBC: CPT | Performed by: HOSPITALIST

## 2024-03-27 PROCEDURE — 63700000 PHARM REV CODE 250 ALT 637 W/O HCPCS: Performed by: HOSPITALIST

## 2024-03-27 PROCEDURE — 25000003 PHARM REV CODE 250: Performed by: STUDENT IN AN ORGANIZED HEALTH CARE EDUCATION/TRAINING PROGRAM

## 2024-03-27 PROCEDURE — 25000003 PHARM REV CODE 250: Performed by: HOSPITALIST

## 2024-03-27 PROCEDURE — 80053 COMPREHEN METABOLIC PANEL: CPT | Performed by: PHYSICIAN ASSISTANT

## 2024-03-27 RX ORDER — CIPROFLOXACIN 500 MG/1
500 TABLET ORAL 2 TIMES DAILY
Qty: 12 TABLET | Refills: 0 | Status: SHIPPED | OUTPATIENT
Start: 2024-03-27 | End: 2024-04-02

## 2024-03-27 RX ORDER — FLUCONAZOLE 100 MG/1
100 TABLET ORAL ONCE
Qty: 1 TABLET | Refills: 0 | Status: SHIPPED | OUTPATIENT
Start: 2024-03-27 | End: 2024-03-27

## 2024-03-27 RX ORDER — FLUCONAZOLE 100 MG/1
100 TABLET ORAL ONCE
Status: COMPLETED | OUTPATIENT
Start: 2024-03-27 | End: 2024-03-27

## 2024-03-27 RX ORDER — METRONIDAZOLE 500 MG/1
500 TABLET ORAL 3 TIMES DAILY
Qty: 18 TABLET | Refills: 0 | Status: SHIPPED | OUTPATIENT
Start: 2024-03-27 | End: 2024-04-02

## 2024-03-27 RX ADMIN — FLUCONAZOLE 100 MG: 100 TABLET ORAL at 08:03

## 2024-03-27 RX ADMIN — ATENOLOL 100 MG: 50 TABLET ORAL at 08:03

## 2024-03-27 RX ADMIN — ATORVASTATIN CALCIUM 10 MG: 10 TABLET, FILM COATED ORAL at 08:03

## 2024-03-27 RX ADMIN — PANTOPRAZOLE SODIUM 40 MG: 40 TABLET, DELAYED RELEASE ORAL at 06:03

## 2024-03-27 RX ADMIN — AMLODIPINE BESYLATE 10 MG: 5 TABLET ORAL at 08:03

## 2024-03-27 RX ADMIN — LOSARTAN POTASSIUM 100 MG: 50 TABLET, FILM COATED ORAL at 08:03

## 2024-03-27 NOTE — DISCHARGE SUMMARY
[facility] Hospital Medicine Discharge Summary    Admit Date: 3/23/2024  Discharge Date and Time: 3/27/60741:47 AM  Admitting Physician: Hospitalist team   Discharging Physician: Norm Cortez MD.  Primary Care Physician: Lesli, Primary Doctor      Discharge Diagnoses:  Acute on chronic diverticulitis   ? Sigmoid stricture  Bilateral nonspecific perinephric stranding  Small pericardial effusion and trace right pleural effusion  Normocytic anemia  History of diverticulitis, hypertension and hyperlipidemia    Hospital Course:   68 y.o. female with a past medical history of essential hypertension, hyperlipidemia, and diverticulitis who presented to [facility] on 3/23/2024 with c/o abdominal pain.  Patient reported lower abdominal pain with diarrhea.  Patient reported 2 episodes of non-bloody diarrhea.  Patient denied nausea, vomiting, fever, chills, dysuria, hematuria, rectal bleeding, dark stools, chest pain, and shortness of breath.  Initial vital signs in ED were /119, pulse 67, respirations 16, temperature 36.7° C, and SpO2 98% on room air.  Labs revealed WBC 7.90, RBC 4.04, hemoglobin 11.9, hematocrit 36.7, chloride 109,  BUN 20.4, and creatinine 0.92.  UA revealed 1+ protein.  CT abdomen and pelvis with IV contrast revealed bilateral nonspecific perinephric stranding which may be chronic with recommendations of correlation for UTI, long segment of sigmoid colon narrowing probably the result of chronic diverticulitis with pathological stricture difficult to exclude, and small pericardial effusion and trace right pleural effusion.  Chest x-ray revealed no acute cardiopulmonary process identified.  GI was consulted with plans for colonoscopy.  Patient was given IV Zofran 4 mg, IV hydralazine 10 mg, and IV morphine 4 mg in ED. Patient was admitted to hospital medicine service for further medical management.      Seen by GI team and recommended IV abx and C-scope.    Colonoscopy performed  "on 3/20 6.  Revealed multiple colonic polyps which were biopsied.  Sent for pathology.  She was returned to the floor in stable condition.  GI recommending finishing up her oral antibiotics for diverticulitis and can follow up in clinic in 1-2 weeks for biopsy results.  I discussed importance of follow up with the patient in getting her biopsy results.  She voices understanding.  She already has an appointment made for GI.  Prescriptions for antibiotics were sent to pharmacy downstairs for the patient was request.  She was requesting a dose of Diflucan due to history of yeast infections with antibiotics.    Vitals:  Blood pressure 107/62, pulse 70, temperature 98 °F (36.7 °C), temperature source Oral, resp. rate 17, height 5' 5" (1.651 m), weight 88 kg (194 lb), SpO2 96 %, not currently breastfeeding..    Physical Exam:  Awake, Alert, Oriented x 3, No new focal Neurologic deficit, Normal Affect  NC/AT, PERRLA, EOMI  Supple neck, no JVD, No cervical lymphadenopathy  Symmetrical chest, Good air entry bilaterally. No rhonchi, wheezes, crackles appreciated  RRR, No gallop, rub or murmur  +ve Bowel sounds x4, Abd soft and non tender, no rebound, guarding or rigidity  No Cyanosis, cludding or edema, No new rash or bruises    Procedures Performed: No admission procedures for hospital encounter.     Significant Diagnostic Studies: See Full reports for all details  No results displayed because visit has over 200 results.           Microbiology Results (last 7 days)       Procedure Component Value Units Date/Time    Blood Culture [[id number]]  (Normal) Collected: 03/24/24 1135    Order Status: Completed Specimen: Blood Updated: 03/26/24 1300     CULTURE, BLOOD ([facility]) No Growth At 48 Hours    Blood Culture [[id number]]  (Normal) Collected: 03/24/24 1135    Order Status: Completed Specimen: Blood Updated: 03/26/24 1300     CULTURE, BLOOD ([facility]) No Growth At 48 Hours    Clostridium Diff Toxin, A & B, EIA [[id number]]     Order " Status: Canceled Specimen: Stool     Stool Culture [[id number]]     Order Status: Canceled Specimen: Stool              Echo    Result Date: 3/24/2024    Left Ventricle: There is normal systolic function with a visually estimated ejection fraction of 60 - 65%. Grade I diastolic dysfunction.   Right Ventricle: Mild right ventricular enlargement. Systolic function is normal.   Left Atrium: Left atrium is mildly dilated.   Right Atrium: Right atrium is mildly dilated.   Aortic Valve: The aortic valve is a trileaflet valve.   Mitral Valve: Mildly thickened leaflets. There is mitral annular calcification present. There is no stenosis. The mean pressure gradient across the mitral valve is 2 mmHg at a heart rate of  bpm. There is mild regurgitation.   Tricuspid Valve: There is mild regurgitation.   Pulmonary Artery: There is mild pulmonary hypertension. The estimated pulmonary artery systolic pressure is 47 mmHg.   IVC/SVC: Normal venous pressure at 3 mmHg.   Pericardium: There is a small effusion. No indication of cardiac tamponade.     US Retroperitoneal Complete    Result Date: 3/24/2024  EXAMINATION: US RETROPERITONEAL COMPLETE CLINICAL HISTORY: renal cysts vs other pathology CT unable to determine; TECHNIQUE: Ultrasound evaluation of the kidneys, region of the ureters, and urinary bladder. COMPARISON: CT 03/23/2024 FINDINGS: No hydronephrosis. Two adjacent cysts within the lower left kidney measure up to 29 and 20 mm.  Ill-defined slightly hypoechoic 29 mm area in the upper left kidney may correspond with an additional left renal cyst on CT.  No suspicious renal lesion identified. Bladder not visualized, likely decompressed. Patent superior IVC.  No significant findings regarding the visualized proximal/mid aorta.  Distal aorta not visualized. Measurements: - Right kidney: 10.4 cm in length - Left kidney: 9.7 cm in length     Few left renal cysts.  No suspicious renal lesion appreciated. Electronically signed  by: Trent Bryan Date:    03/24/2024 Time:    15:05    X-Ray Chest AP Portable    Result Date: 3/23/2024  EXAMINATION: XR CHEST AP PORTABLE CLINICAL HISTORY: preop exam; TECHNIQUE: One view COMPARISON: None available. FINDINGS: Cardiopericardial silhouette enlarged appearance is similar.  Lungs are without dense focal or segmental consolidation, congestive process, pleural effusions or pneumothorax.     NO ACUTE CARDIOPULMONARY PROCESS IDENTIFIED. Electronically signed by: Murali Ramirez Date:    03/23/2024 Time:    21:59    CT Abdomen Pelvis With IV Contrast NO Oral Contrast    Result Date: 3/23/2024  EXAMINATION: CT ABDOMEN PELVIS WITH IV CONTRAST CLINICAL HISTORY: lower abdominal pain; TECHNIQUE: Multidetector axial images were obtained of the abdomen and pelvis following the administration of IV contrast. Oral contrast was not administered. Dose length product of 683 mGycm. Automated exposure control was utilized to minimize radiation dose. COMPARISON: None available. FINDINGS: Visualized portion of the heart is enlarged in size and there is partially visualized pericardial effusion with maximum thickness of 1.4 cm.  There is trace of fluid within the right dependent pleural space.  No acute infiltrates or congestive opacities of imaged portion of the lungs. Hepatic volume and attenuation is unremarkable. Gallbladder wall is not thickened and there is no intra luminal calcified calculus. No biliary dilation identified. Pancreatic unremarkable attenuation without acute peripancreatic phlegmons. Main pancreatic duct is not dilated. Spleen is of normal size without focal lesion. Right adrenal gland is unremarkable.  There is slight thickening of the left adrenal gland.  Bilateral kidneys lobular contour is consistent with scarring.  The left kidney up to 2.5 cm hypodensities which are favored to be cysts and for these no specific imaging is recommended.  There are additional very small kidneys hypodensities which  may again represent cysts but are small to characterize and can be further assessed with ultrasound exam.  There are perinephric strandings combined minimal amount of fluid.  No kidneys collecting system dilatation and there is no hydroureter.  There are no retroperitoneal periaortic enlarged lymph nodes. Stomach is decompressed and difficult to assess.  Small hiatal hernia.  Distal esophageal mild mural thickening may be the result of reflux disease.  No abnormal dilatation of the loops of small bowel.  Appendix is nondilated.  There is diverticulosis coli with sigmoid colon predominance.  There is irregular luminal narrowing of the proximal portion of the sigmoid colon from image 91 to image 98 series 2 which may be the result of chronic diverticulitis.  Possibility of pathologic stricture is difficult to exclude on this exam.  Please correlate patient is up-to-date on colonoscopy.  There are no convincing findings of acute diverticulitis and there is no bowel obstruction, free air or free fluid. Urinary bladder appears within normal limits. There is no pelvic free fluid.  There is right pelvic intramuscular lipoma There is grade 1 anterolisthesis of L4 on L5.  Multilevel thoracolumbar degenerative changes.  Demineralization of the bones.  Chronic deformity of the left iliac bone.  No acute or otherwise osseous abnormality identified.     1. Bilateral kidneys scarring and hypodense cystic structure discussed above.  Bilateral nonspecific perinephric strandings.  These may be chronic.  Please correlate clinically there is no acute urinary tract infection. 2. Long segment of sigmoid colon narrowing probably is the result of chronic diverticulitis.  A pathologic stricture is difficult to exclude on this exam.  Please correlate, patient is up-to-date on colonoscopy.  No convincing findings of acute diverticulitis. 3.  Small pericardial effusion and trace of right pleural effusion. Electronically signed by: Murali  Ramirez Date:    03/23/2024 Time:    19:16  - pulls last radiology orders        Medication List        START taking these medications      ciprofloxacin HCl 500 MG tablet  Commonly known as: CIPRO  Take 1 tablet (500 mg total) by mouth 2 (two) times daily. for 6 days     metroNIDAZOLE 500 MG tablet  Commonly known as: FLAGYL  Take 1 tablet (500 mg total) by mouth 3 (three) times daily. for 6 days            CONTINUE taking these medications      amLODIPine 5 MG tablet  Commonly known as: NORVASC     atenoloL 100 MG tablet  Commonly known as: TENORMIN     doxazosin 4 MG tablet  Commonly known as: CARDURA     doxylamine-phenylephrine 10.5-10 mg Tab     estradioL 2 MG tablet  Commonly known as: ESTRACE     losartan 100 MG tablet  Commonly known as: COZAAR     simvastatin 20 MG tablet  Commonly known as: ZOCOR               Where to Get Your Medications        These medications were sent to [facility] - [address]  [address] [facility] [address], [facility] [address]      Phone: [phone]   ciprofloxacin HCl 500 MG tablet  metroNIDAZOLE 500 MG tablet          Explained in detail to the patient about the discharge plan, medications, and follow-up visits. Pt understands and agrees with the treatment plan  Discharged Condition: stable  Diet: regular  Disposition: home    Medications Per [facility] med rec  Activities as tolerated  Follow up with your PCP in 2 wks   For further questions contact hospitalist office    Discharge time 33 minutes    For worsening symptoms, chest pain, shortness of breath, increased abdominal pain, high grade fever, stroke or stroke like symptoms, immediately go to the nearest Emergency Room or call 911 as soon as possible.        Norm John M.D, on 3/27/2024. at 6:47 AM.

## 2024-03-28 LAB — PSYCHE PATHOLOGY RESULT: NORMAL

## 2024-03-29 LAB
BACTERIA BLD CULT: NORMAL
BACTERIA BLD CULT: NORMAL

## 2024-04-24 ENCOUNTER — LAB VISIT (OUTPATIENT)
Dept: LAB | Facility: HOSPITAL | Age: 69
End: 2024-04-24
Attending: INTERNAL MEDICINE
Payer: MEDICARE

## 2024-04-24 DIAGNOSIS — Z86.010 PERSONAL HISTORY OF COLONIC POLYPS: Primary | ICD-10-CM

## 2024-04-24 LAB
ALBUMIN SERPL-MCNC: 3.8 G/DL (ref 3.4–4.8)
ALBUMIN/GLOB SERPL: 1.1 RATIO (ref 1.1–2)
ALP SERPL-CCNC: 58 UNIT/L (ref 40–150)
ALT SERPL-CCNC: 14 UNIT/L (ref 0–55)
AST SERPL-CCNC: 17 UNIT/L (ref 5–34)
BASOPHILS # BLD AUTO: 0.03 X10(3)/MCL
BASOPHILS NFR BLD AUTO: 0.4 %
BILIRUB SERPL-MCNC: 0.6 MG/DL
BUN SERPL-MCNC: 21.8 MG/DL (ref 9.8–20.1)
CALCIUM SERPL-MCNC: 9.2 MG/DL (ref 8.4–10.2)
CHLORIDE SERPL-SCNC: 111 MMOL/L (ref 98–107)
CO2 SERPL-SCNC: 26 MMOL/L (ref 23–31)
CREAT SERPL-MCNC: 1.13 MG/DL (ref 0.55–1.02)
EOSINOPHIL # BLD AUTO: 0.07 X10(3)/MCL (ref 0–0.9)
EOSINOPHIL NFR BLD AUTO: 0.9 %
ERYTHROCYTE [DISTWIDTH] IN BLOOD BY AUTOMATED COUNT: 13.9 % (ref 11.5–17)
GFR SERPLBLD CREATININE-BSD FMLA CKD-EPI: 52 MLS/MIN/1.73/M2
GLOBULIN SER-MCNC: 3.6 GM/DL (ref 2.4–3.5)
GLUCOSE SERPL-MCNC: 92 MG/DL (ref 82–115)
HCT VFR BLD AUTO: 33.2 % (ref 37–47)
HGB BLD-MCNC: 10.8 G/DL (ref 12–16)
IMM GRANULOCYTES # BLD AUTO: 0.03 X10(3)/MCL (ref 0–0.04)
IMM GRANULOCYTES NFR BLD AUTO: 0.4 %
LYMPHOCYTES # BLD AUTO: 2.09 X10(3)/MCL (ref 0.6–4.6)
LYMPHOCYTES NFR BLD AUTO: 27 %
MCH RBC QN AUTO: 29.1 PG (ref 27–31)
MCHC RBC AUTO-ENTMCNC: 32.5 G/DL (ref 33–36)
MCV RBC AUTO: 89.5 FL (ref 80–94)
MONOCYTES # BLD AUTO: 0.52 X10(3)/MCL (ref 0.1–1.3)
MONOCYTES NFR BLD AUTO: 6.7 %
NEUTROPHILS # BLD AUTO: 5.01 X10(3)/MCL (ref 2.1–9.2)
NEUTROPHILS NFR BLD AUTO: 64.6 %
NRBC BLD AUTO-RTO: 0 %
PLATELET # BLD AUTO: 166 X10(3)/MCL (ref 130–400)
PMV BLD AUTO: 10.7 FL (ref 7.4–10.4)
POTASSIUM SERPL-SCNC: 4.2 MMOL/L (ref 3.5–5.1)
PROT SERPL-MCNC: 7.4 GM/DL (ref 5.8–7.6)
RBC # BLD AUTO: 3.71 X10(6)/MCL (ref 4.2–5.4)
SODIUM SERPL-SCNC: 143 MMOL/L (ref 136–145)
WBC # SPEC AUTO: 7.75 X10(3)/MCL (ref 4.5–11.5)

## 2024-04-24 PROCEDURE — 85025 COMPLETE CBC W/AUTO DIFF WBC: CPT

## 2024-04-24 PROCEDURE — 36415 COLL VENOUS BLD VENIPUNCTURE: CPT

## 2024-04-24 PROCEDURE — 80053 COMPREHEN METABOLIC PANEL: CPT

## (undated) DEVICE — UNDERPAD DISPOSABLE 30X30IN

## (undated) DEVICE — CONTAINER SPECIMEN STRL 4OZ

## (undated) DEVICE — KIT CANIST SUCTION 1200CC

## (undated) DEVICE — COLLECTION SPECIMEN NEPTUNE

## (undated) DEVICE — SOL IRRI STRL WATER 1000ML

## (undated) DEVICE — KIT SURGICAL COLON .25 1.1OZ

## (undated) DEVICE — ADAPTER DUAL NSL LUER M-M 7FT

## (undated) DEVICE — SNARE CAPTIFLEX 2.4X27MM 240CM

## (undated) DEVICE — TRAP SUCTION POLYP

## (undated) DEVICE — TIP SUCTION YANKAUER